# Patient Record
Sex: MALE | Race: WHITE | NOT HISPANIC OR LATINO | Employment: FULL TIME | URBAN - METROPOLITAN AREA
[De-identification: names, ages, dates, MRNs, and addresses within clinical notes are randomized per-mention and may not be internally consistent; named-entity substitution may affect disease eponyms.]

---

## 2017-02-01 ENCOUNTER — HOSPITAL ENCOUNTER (OUTPATIENT)
Dept: FAMILY MEDICINE CLINIC | Facility: CLINIC | Age: 32
Setting detail: SPECIMEN
Discharge: HOME | End: 2017-02-01
Attending: PHYSICIAN ASSISTANT | Admitting: PHYSICIAN ASSISTANT

## 2017-02-01 LAB
ALBUMIN SERPL-MCNC: 3.6 G/DL (ref 3.5–4.8)
ALBUMIN/GLOB SERPL: 1.1 {RATIO} (ref 1–1.7)
ALP SERPL-CCNC: 104 IU/L (ref 32–91)
ALT SERPL-CCNC: 43 IU/L (ref 17–63)
ANION GAP SERPL CALC-SCNC: 13 MMOL/L (ref 10–20)
AST SERPL-CCNC: 32 IU/L (ref 15–41)
BASOPHILS # BLD AUTO: 0.1 10*3/UL (ref 0–0.2)
BASOPHILS NFR BLD AUTO: 1 % (ref 0–2)
BILIRUB SERPL-MCNC: 0.5 MG/DL (ref 0.3–1.2)
BUN SERPL-MCNC: 10 MG/DL (ref 8–20)
BUN/CREAT SERPL: 11.1 (ref 6.2–20.3)
CALCIUM SERPL-MCNC: 9.3 MG/DL (ref 8.9–10.3)
CHLORIDE SERPL-SCNC: 99 MMOL/L (ref 101–111)
CHOLEST SERPL-MCNC: 182 MG/DL
CHOLEST/HDLC SERPL: 5.8 {RATIO}
CONV CO2: 28 MMOL/L (ref 22–32)
CONV LDL CHOLESTEROL DIRECT: 103 MG/DL (ref 0–100)
CONV TOTAL PROTEIN: 6.8 G/DL (ref 6.1–7.9)
CREAT UR-MCNC: 0.9 MG/DL (ref 0.7–1.2)
DIFFERENTIAL METHOD BLD: (no result)
EOSINOPHIL # BLD AUTO: 0.2 10*3/UL (ref 0–0.3)
EOSINOPHIL # BLD AUTO: 2 % (ref 0–3)
ERYTHROCYTE [DISTWIDTH] IN BLOOD BY AUTOMATED COUNT: 13.8 % (ref 11.5–14.5)
GLOBULIN UR ELPH-MCNC: 3.2 G/DL (ref 2.5–3.8)
GLUCOSE SERPL-MCNC: 85 MG/DL (ref 65–99)
HCT VFR BLD AUTO: 52.1 % (ref 40–54)
HDLC SERPL-MCNC: 31 MG/DL
HGB BLD-MCNC: 17.8 G/DL (ref 14–18)
LDLC/HDLC SERPL: 3.3 {RATIO}
LIPID INTERPRETATION: ABNORMAL
LYMPHOCYTES # BLD AUTO: 4.1 10*3/UL (ref 0.8–4.8)
LYMPHOCYTES NFR BLD AUTO: 35 % (ref 18–42)
MCH RBC QN AUTO: 30.9 PG (ref 26–32)
MCHC RBC AUTO-ENTMCNC: 34.2 G/DL (ref 32–36)
MCV RBC AUTO: 90.3 FL (ref 80–94)
MONOCYTES # BLD AUTO: 0.9 10*3/UL (ref 0.1–1.3)
MONOCYTES NFR BLD AUTO: 8 % (ref 2–11)
NEUTROPHILS # BLD AUTO: 6.3 10*3/UL (ref 2.3–8.6)
NEUTROPHILS NFR BLD AUTO: 54 % (ref 50–75)
NRBC BLD AUTO-RTO: 0 /100{WBCS}
NRBC/RBC NFR BLD MANUAL: 0 10*3/UL
PLATELET # BLD AUTO: 210 10*3/UL (ref 150–450)
PMV BLD AUTO: 9.3 FL (ref 7.4–10.4)
POTASSIUM SERPL-SCNC: 4 MMOL/L (ref 3.6–5.1)
RBC # BLD AUTO: 5.77 10*6/UL (ref 4.6–6)
SODIUM SERPL-SCNC: 136 MMOL/L (ref 136–144)
TRIGL SERPL-MCNC: 264 MG/DL
VLDLC SERPL CALC-MCNC: 48.1 MG/DL
WBC # BLD AUTO: 11.7 10*3/UL (ref 4.5–11.5)

## 2018-02-02 ENCOUNTER — HOSPITAL ENCOUNTER (OUTPATIENT)
Dept: FAMILY MEDICINE CLINIC | Facility: CLINIC | Age: 33
Setting detail: SPECIMEN
Discharge: HOME | End: 2018-02-02
Attending: NURSE PRACTITIONER | Admitting: NURSE PRACTITIONER

## 2018-02-02 LAB
ANION GAP SERPL CALC-SCNC: 13 MMOL/L (ref 10–20)
BUN SERPL-MCNC: 14 MG/DL (ref 8–20)
BUN/CREAT SERPL: 15.6 (ref 6.2–20.3)
CALCIUM SERPL-MCNC: 9.1 MG/DL (ref 8.9–10.3)
CHLORIDE SERPL-SCNC: 99 MMOL/L (ref 101–111)
CHOLEST SERPL-MCNC: 163 MG/DL
CHOLEST/HDLC SERPL: 6.2 {RATIO}
CONV CO2: 29 MMOL/L (ref 22–32)
CONV LDL CHOLESTEROL DIRECT: 89 MG/DL (ref 0–100)
CREAT UR-MCNC: 0.9 MG/DL (ref 0.7–1.2)
GLUCOSE SERPL-MCNC: 69 MG/DL (ref 65–99)
HDLC SERPL-MCNC: 26 MG/DL
LDLC/HDLC SERPL: 3.4 {RATIO}
LIPID INTERPRETATION: ABNORMAL
POTASSIUM SERPL-SCNC: 4 MMOL/L (ref 3.6–5.1)
SODIUM SERPL-SCNC: 137 MMOL/L (ref 136–144)
TRIGL SERPL-MCNC: 279 MG/DL
VLDLC SERPL CALC-MCNC: 47.4 MG/DL

## 2018-06-01 ENCOUNTER — HOSPITAL ENCOUNTER (OUTPATIENT)
Dept: CARDIOLOGY | Facility: HOSPITAL | Age: 33
Discharge: HOME | End: 2018-06-01
Attending: PHYSICIAN ASSISTANT | Admitting: PHYSICIAN ASSISTANT

## 2018-06-01 ENCOUNTER — HOSPITAL ENCOUNTER (OUTPATIENT)
Dept: FAMILY MEDICINE CLINIC | Facility: CLINIC | Age: 33
Setting detail: SPECIMEN
Discharge: HOME | End: 2018-06-01
Attending: PHYSICIAN ASSISTANT | Admitting: PHYSICIAN ASSISTANT

## 2018-06-01 LAB
BASOPHILS # BLD AUTO: 0.1 10*3/UL (ref 0–0.2)
BASOPHILS NFR BLD AUTO: 1 % (ref 0–2)
D DIMER PPP FEU-MCNC: 0.59 MG/L FEU (ref 0.17–0.59)
DIFFERENTIAL METHOD BLD: (no result)
EOSINOPHIL # BLD AUTO: 0.3 10*3/UL (ref 0–0.3)
EOSINOPHIL # BLD AUTO: 3 % (ref 0–3)
ERYTHROCYTE [DISTWIDTH] IN BLOOD BY AUTOMATED COUNT: 13.7 % (ref 11.5–14.5)
HCT VFR BLD AUTO: 53.6 % (ref 40–54)
HGB BLD-MCNC: 17.7 G/DL (ref 14–18)
LYMPHOCYTES # BLD AUTO: 3.2 10*3/UL (ref 0.8–4.8)
LYMPHOCYTES NFR BLD AUTO: 29 % (ref 18–42)
MCH RBC QN AUTO: 30.4 PG (ref 26–32)
MCHC RBC AUTO-ENTMCNC: 32.9 G/DL (ref 32–36)
MCV RBC AUTO: 92.3 FL (ref 80–94)
MONOCYTES # BLD AUTO: 1.1 10*3/UL (ref 0.1–1.3)
MONOCYTES NFR BLD AUTO: 10 % (ref 2–11)
NEUTROPHILS # BLD AUTO: 6.4 10*3/UL (ref 2.3–8.6)
NEUTROPHILS NFR BLD AUTO: 57 % (ref 50–75)
NRBC BLD AUTO-RTO: 0 /100{WBCS}
NRBC/RBC NFR BLD MANUAL: 0 10*3/UL
PLATELET # BLD AUTO: 234 10*3/UL (ref 150–450)
PMV BLD AUTO: 9.5 FL (ref 7.4–10.4)
RBC # BLD AUTO: 5.81 10*6/UL (ref 4.6–6)
WBC # BLD AUTO: 11.1 10*3/UL (ref 4.5–11.5)

## 2019-02-28 ENCOUNTER — HOSPITAL ENCOUNTER (OUTPATIENT)
Dept: FAMILY MEDICINE CLINIC | Facility: CLINIC | Age: 34
Setting detail: SPECIMEN
Discharge: HOME | End: 2019-02-28
Attending: PHYSICIAN ASSISTANT | Admitting: PHYSICIAN ASSISTANT

## 2019-02-28 LAB
ALBUMIN SERPL-MCNC: 3.8 G/DL (ref 3.5–4.8)
ALBUMIN/GLOB SERPL: 1.1 {RATIO} (ref 1–1.7)
ALP SERPL-CCNC: 104 IU/L (ref 32–91)
ALT SERPL-CCNC: 59 IU/L (ref 17–63)
ANION GAP SERPL CALC-SCNC: 14.5 MMOL/L (ref 10–20)
AST SERPL-CCNC: 39 IU/L (ref 15–41)
BASOPHILS # BLD AUTO: 0.1 10*3/UL (ref 0–0.2)
BASOPHILS NFR BLD AUTO: 1 % (ref 0–2)
BILIRUB SERPL-MCNC: 0.9 MG/DL (ref 0.3–1.2)
BUN SERPL-MCNC: 13 MG/DL (ref 8–20)
BUN/CREAT SERPL: 13 (ref 6.2–20.3)
CALCIUM SERPL-MCNC: 9.2 MG/DL (ref 8.9–10.3)
CHLORIDE SERPL-SCNC: 93 MMOL/L (ref 101–111)
CONV CO2: 28 MMOL/L (ref 22–32)
CONV TOTAL PROTEIN: 7.3 G/DL (ref 6.1–7.9)
CREAT UR-MCNC: 1 MG/DL (ref 0.7–1.2)
DIFFERENTIAL METHOD BLD: (no result)
EOSINOPHIL # BLD AUTO: 0.2 10*3/UL (ref 0–0.3)
EOSINOPHIL # BLD AUTO: 2 % (ref 0–3)
ERYTHROCYTE [DISTWIDTH] IN BLOOD BY AUTOMATED COUNT: 13.4 % (ref 11.5–14.5)
GLOBULIN UR ELPH-MCNC: 3.5 G/DL (ref 2.5–3.8)
GLUCOSE SERPL-MCNC: 145 MG/DL (ref 65–99)
HCT VFR BLD AUTO: 51.5 % (ref 40–54)
HGB BLD-MCNC: 17.6 G/DL (ref 14–18)
LYMPHOCYTES # BLD AUTO: 4.5 10*3/UL (ref 0.8–4.8)
LYMPHOCYTES NFR BLD AUTO: 37 % (ref 18–42)
MCH RBC QN AUTO: 31.1 PG (ref 26–32)
MCHC RBC AUTO-ENTMCNC: 34.2 G/DL (ref 32–36)
MCV RBC AUTO: 91.1 FL (ref 80–94)
MONOCYTES # BLD AUTO: 0.9 10*3/UL (ref 0.1–1.3)
MONOCYTES NFR BLD AUTO: 8 % (ref 2–11)
NEUTROPHILS # BLD AUTO: 6.5 10*3/UL (ref 2.3–8.6)
NEUTROPHILS NFR BLD AUTO: 52 % (ref 50–75)
NRBC BLD AUTO-RTO: 0 /100{WBCS}
NRBC/RBC NFR BLD MANUAL: 0 10*3/UL
PLATELET # BLD AUTO: 207 10*3/UL (ref 150–450)
PMV BLD AUTO: 9.4 FL (ref 7.4–10.4)
POTASSIUM SERPL-SCNC: 3.5 MMOL/L (ref 3.6–5.1)
RBC # BLD AUTO: 5.66 10*6/UL (ref 4.6–6)
SODIUM SERPL-SCNC: 132 MMOL/L (ref 136–144)
WBC # BLD AUTO: 12.2 10*3/UL (ref 4.5–11.5)

## 2019-03-01 LAB
CONV HIV-1/ HIV-2: NORMAL
CONV HIV-1/ HIV-2: NORMAL

## 2019-05-24 ENCOUNTER — HOSPITAL ENCOUNTER (OUTPATIENT)
Dept: LAB | Facility: HOSPITAL | Age: 34
Discharge: HOME | End: 2019-05-24
Attending: UROLOGY | Admitting: UROLOGY

## 2019-05-24 LAB
PSA SERPL-MCNC: 0.77 NG/ML (ref 0–4)
TESTOST SERPL-MCNC: 4.1 NG/ML (ref 1.7–7.8)

## 2019-08-22 ENCOUNTER — OFFICE VISIT (OUTPATIENT)
Dept: FAMILY MEDICINE CLINIC | Facility: CLINIC | Age: 34
End: 2019-08-22

## 2019-08-22 VITALS
TEMPERATURE: 98.4 F | BODY MASS INDEX: 35.84 KG/M2 | HEART RATE: 103 BPM | HEIGHT: 69 IN | OXYGEN SATURATION: 97 % | DIASTOLIC BLOOD PRESSURE: 83 MMHG | WEIGHT: 242 LBS | SYSTOLIC BLOOD PRESSURE: 115 MMHG

## 2019-08-22 DIAGNOSIS — Z00.00 PREVENTATIVE HEALTH CARE: Primary | ICD-10-CM

## 2019-08-22 PROBLEM — R00.0 SINUS TACHYCARDIA: Status: ACTIVE | Noted: 2018-06-11

## 2019-08-22 PROBLEM — I10 BENIGN ESSENTIAL HYPERTENSION: Status: ACTIVE | Noted: 2018-02-02

## 2019-08-22 PROBLEM — E11.65 TYPE 2 DIABETES MELLITUS WITH HYPERGLYCEMIA: Status: ACTIVE | Noted: 2019-03-01

## 2019-08-22 PROBLEM — F17.200 TOBACCO DEPENDENCE SYNDROME: Status: ACTIVE | Noted: 2017-02-01

## 2019-08-22 PROBLEM — E66.9 OBESITY: Status: ACTIVE | Noted: 2017-02-01

## 2019-08-22 LAB
ARTICHOKE IGE QN: 110 MG/DL (ref 0–100)
CHOLEST SERPL-MCNC: 161 MG/DL
HBA1C MFR BLD: 5.5 % (ref 3.5–5.6)
HDLC SERPL QL: 4.74
HDLC SERPL-MCNC: 34 MG/DL
LDLC/HDLC SERPL: 2.91 {RATIO}
TRIGL SERPL-MCNC: 141 MG/DL
VLDLC SERPL-MCNC: 28.2 MG/DL

## 2019-08-22 PROCEDURE — 99395 PREV VISIT EST AGE 18-39: CPT | Performed by: PHYSICIAN ASSISTANT

## 2019-08-22 PROCEDURE — 80061 LIPID PANEL: CPT | Performed by: PHYSICIAN ASSISTANT

## 2019-08-22 PROCEDURE — 36415 COLL VENOUS BLD VENIPUNCTURE: CPT | Performed by: PHYSICIAN ASSISTANT

## 2019-08-22 PROCEDURE — 83036 HEMOGLOBIN GLYCOSYLATED A1C: CPT | Performed by: PHYSICIAN ASSISTANT

## 2019-08-22 RX ORDER — ONDANSETRON 8 MG/1
TABLET, ORALLY DISINTEGRATING ORAL EVERY 8 HOURS
COMMUNITY
Start: 2019-01-31 | End: 2020-02-27

## 2019-08-22 RX ORDER — BLOOD-GLUCOSE METER
EACH MISCELLANEOUS
COMMUNITY
Start: 2019-03-01 | End: 2022-09-12 | Stop reason: SDUPTHER

## 2019-08-22 RX ORDER — FLASH GLUCOSE SENSOR
KIT MISCELLANEOUS
COMMUNITY
Start: 2019-06-07 | End: 2020-07-09 | Stop reason: SDUPTHER

## 2019-08-22 RX ORDER — AMLODIPINE BESYLATE 10 MG/1
TABLET ORAL
COMMUNITY
Start: 2019-05-28 | End: 2020-12-11

## 2019-08-22 RX ORDER — TESTOSTERONE CYPIONATE 200 MG/ML
INJECTION, SOLUTION INTRAMUSCULAR
COMMUNITY
Start: 2019-07-29 | End: 2020-12-11

## 2019-08-22 RX ORDER — METOPROLOL SUCCINATE 50 MG/1
TABLET, EXTENDED RELEASE ORAL EVERY 24 HOURS
COMMUNITY
Start: 2018-09-14 | End: 2021-07-13

## 2019-08-22 RX ORDER — METHADONE HYDROCHLORIDE 5 MG/5ML
130 SOLUTION ORAL EVERY 24 HOURS
COMMUNITY
Start: 2014-06-16

## 2019-08-22 RX ORDER — FLASH GLUCOSE SCANNING READER
EACH MISCELLANEOUS
COMMUNITY
Start: 2019-06-07 | End: 2020-07-21 | Stop reason: SDUPTHER

## 2019-08-22 NOTE — PROGRESS NOTES
"Subjective  Aram Moore is a 33 y.o. male     History of Present Illness  Patient is a 33-year-old white male here for biometric screening for his insurance.  He denies any changes to his medications and denies any new problems.      The following portions of the patient's history were reviewed and updated as appropriate: allergies, current medications, past family history, past medical history, past social history, past surgical history and problem list.    Review of Systems   Constitutional: Negative for fever.   HENT: Negative for sore throat.    Respiratory: Negative for shortness of breath.    Cardiovascular: Negative for chest pain.   Gastrointestinal: Negative for abdominal pain and blood in stool.   Musculoskeletal: Negative for joint swelling.   Neurological: Negative for seizures and confusion.   Psychiatric/Behavioral: Negative for depressed mood.       Objective  Physical Exam   Constitutional: He is oriented to person, place, and time. He appears well-developed and well-nourished.   HENT:   Head: Normocephalic and atraumatic.   Right Ear: External ear normal.   Left Ear: External ear normal.   Mouth/Throat: Oropharynx is clear and moist.   Eyes: Conjunctivae and EOM are normal. Pupils are equal, round, and reactive to light.   Neck: Normal range of motion. Neck supple.   Cardiovascular: Normal rate, regular rhythm and normal heart sounds.   Pulmonary/Chest: Effort normal and breath sounds normal.   Abdominal: Soft. Bowel sounds are normal.   Musculoskeletal: Normal range of motion.   Neurological: He is alert and oriented to person, place, and time.   Skin: Skin is warm and dry.       Vitals:    08/22/19 0850   BP: 115/83   BP Location: Right arm   Patient Position: Sitting   Cuff Size: Adult   Pulse: 103   Temp: 98.4 °F (36.9 °C)   TempSrc: Oral   SpO2: 97%   Weight: 110 kg (242 lb)   Height: 175.3 cm (69\")     Body mass index is 35.74 kg/m².    PHQ-9 Total Score: 0      Assessment/Plan  Diagnoses " and all orders for this visit:    1. Preventative health care (Primary)  Comments:  Labs today, I will fill out forms that patient brought when I get the blood work results back.  Orders:  -     Lipid Panel  -     Hemoglobin A1c

## 2019-09-20 ENCOUNTER — HOSPITAL ENCOUNTER (OUTPATIENT)
Dept: GENERAL RADIOLOGY | Facility: HOSPITAL | Age: 34
Discharge: HOME | End: 2019-09-20
Admitting: PHYSICIAN ASSISTANT

## 2019-09-20 ENCOUNTER — OFFICE VISIT (OUTPATIENT)
Dept: FAMILY MEDICINE CLINIC | Facility: CLINIC | Age: 34
End: 2019-09-20

## 2019-09-20 VITALS
TEMPERATURE: 98.2 F | BODY MASS INDEX: 36.92 KG/M2 | OXYGEN SATURATION: 100 % | SYSTOLIC BLOOD PRESSURE: 131 MMHG | HEART RATE: 96 BPM | WEIGHT: 250 LBS | DIASTOLIC BLOOD PRESSURE: 83 MMHG

## 2019-09-20 DIAGNOSIS — M25.561 ACUTE PAIN OF RIGHT KNEE: Primary | ICD-10-CM

## 2019-09-20 PROCEDURE — 73562 X-RAY EXAM OF KNEE 3: CPT

## 2019-09-20 PROCEDURE — 99213 OFFICE O/P EST LOW 20 MIN: CPT | Performed by: PHYSICIAN ASSISTANT

## 2019-09-20 RX ORDER — DICLOFENAC SODIUM 75 MG/1
75 TABLET, DELAYED RELEASE ORAL 2 TIMES DAILY
Qty: 60 TABLET | Refills: 0 | Status: SHIPPED | OUTPATIENT
Start: 2019-09-20 | End: 2020-12-11 | Stop reason: SDUPTHER

## 2019-09-20 NOTE — PROGRESS NOTES
Subjective  Aram Moore is a 33 y.o. male     History of Present Illness    Patient is a 33-year-old white male complaining of right knee pain for the last 10 days.  He states he has been playing tennis more than usual lately.  The pain has begun slowly over the last 10 days, there was no acute injury to the area.  He says it feels like popcorn when he walks on it.  He has tried ibuprofen over-the-counter which does not help.  He is wearing a brace on the right knee today.    The following portions of the patient's history were reviewed and updated as appropriate: allergies, current medications and problem list.    Review of Systems   Musculoskeletal: Positive for arthralgias (r knee pain).       Objective  Physical Exam   Musculoskeletal:        Right knee: He exhibits normal range of motion, no swelling, no effusion, no ecchymosis, no deformity, no laceration, no erythema, normal alignment, no LCL laxity, no bony tenderness, normal meniscus and no MCL laxity. No tenderness found.        Legs:      Vitals:    09/20/19 0914   BP: 131/83   BP Location: Right arm   Patient Position: Sitting   Cuff Size: Adult   Pulse: 96   Temp: 98.2 °F (36.8 °C)   TempSrc: Oral   SpO2: 100%   Weight: 113 kg (250 lb)     Body mass index is 36.92 kg/m².    PHQ-9 Total Score:        Assessment/Plan  Diagnoses and all orders for this visit:    1. Acute pain of right knee (Primary)  Comments:  X-ray of the right knee ordered.  Patient to complete some physical therapy prior to additional imaging being ordered.  Will refer as needed.  Rx for diclofenac to try for pain.  Recommended rice therapy to the knee.  Orders:  -     XR Knee 3 View Right  -     Ambulatory Referral to Physical Therapy Evaluate and treat    Other orders  -     diclofenac (VOLTAREN) 75 MG EC tablet; Take 1 tablet by mouth 2 (Two) Times a Day.  Dispense: 60 tablet; Refill: 0

## 2019-11-19 ENCOUNTER — TREATMENT (OUTPATIENT)
Dept: PHYSICAL THERAPY | Facility: CLINIC | Age: 34
End: 2019-11-19

## 2019-11-19 DIAGNOSIS — M25.561 ACUTE PAIN OF RIGHT KNEE: Primary | ICD-10-CM

## 2019-11-19 PROCEDURE — 97110 THERAPEUTIC EXERCISES: CPT | Performed by: PHYSICAL THERAPIST

## 2019-11-19 PROCEDURE — 97162 PT EVAL MOD COMPLEX 30 MIN: CPT | Performed by: PHYSICAL THERAPIST

## 2019-11-19 PROCEDURE — 97140 MANUAL THERAPY 1/> REGIONS: CPT | Performed by: PHYSICAL THERAPIST

## 2019-11-19 NOTE — PROGRESS NOTES
Physical Therapy Initial Evaluation and Plan of Care    Patient: Aram Moore   : 1985  Diagnosis/ICD-10 Code:  Acute pain of right knee [M25.561]  Referring practitioner: LETICIA Tenorio  Date of Initial Visit: 2019  Today's Date: 2019  Patient seen for 1 sessions           Subjective Questionnaire: Knee Outcome survey: 63% functional ability      Subjective Evaluation    History of Present Illness  Mechanism of injury: Pt reports that he was playing tennis around 2 months and hurt R knee. Pt reports no specific movement that hurt just gradual onset. Felt it giving away for a few weeks after that. Didn't think anything about it but then one night it swelled up. Now feels like he can feel cartilage moving around in there. He has trouble going up and down stairs. No previous history of knee injuries. Physician thinks it's possibly meniscus tear but can't have MRI until after PT sessions. Keeping knee bent for a while or staying in certain positions for a while increase pain. Keeping knee straight doesn't cause pain. Walking increases pain as well. Pt was given NSAID from physician which helps with swelling some. No numbness or tingling. Can't play tennis right now. 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 Knee still feels like it's giving out a little but it has improved sine the first few weeks. Knee feels more unsteady and uncomfortable but not as much pain recently. MD as needed.     Pain  Current pain ratin  At best pain ratin  At worst pain ratin             Objective       Palpation     Right Tenderness of the rectus femoris.     Tenderness     Right Knee   Tenderness in the quadriceps tendon. No tenderness in the lateral joint line, LCL (distal), LCL (proximal), MCL (distal), MCL (proximal), medial joint line and patellar tendon.     Neurological Testing     Sensation     Knee   Left Knee   Intact: light touch    Right Knee   Intact: light touch     Active  Range of Motion   Left Knee   Normal active range of motion    Right Knee   Flexion: 127 degrees   Extension: 0 degrees     Additional Active Range of Motion Details  No pain with knee AROM    Patellar Mobility     Right Knee Patellar tendons within functional limits include the lateral and superior. Hypomobile in the medial and inferior patellar tendon(s).     Strength/Myotome Testing     Left Knee   Normal strength    Right Knee   Normal strength    Tests     Right Knee   Negative anterior drawer, lateral Jodi, medial Jodi, posterior drawer, valgus stress test at 0 degrees and varus stress test at 0 degrees.     Additional Tests Details  No pain with any special tests     Functional Assessment   Squat   Left tibial anterior translation beyond toes and right tibial anterior translation beyond toes. No pain, no left valgus, no left varus, no right valgus and no right varus.          Assessment & Plan     Assessment  Impairments: abnormal coordination, abnormal gait, abnormal muscle firing, abnormal muscle tone, abnormal or restricted ROM, activity intolerance, impaired balance, impaired physical strength, lacks appropriate home exercise program and pain with function  Assessment details: Pt is a 34 year old male with c/o R knee pain. Pt demonstrates no pain with special tests for ligaments or meniscus. Some tenderness along distal quad and patellar tendon. No pain with flexion, however was limited compared to L. ROM improved from 127 to 132 degrees flexion after manual therapy this date. Pt displays no pain with squat, however felt it would hurt if he went lower. Pt displays decreased ability to perform eccentric activities like descending steps due to pain. Difficulty with other functional tasks as well due to pain.   Prognosis: good  Functional Limitations: lifting, walking, pulling, pushing, uncomfortable because of pain, sitting and standing  Goals  Plan Goals: STG:  Pt will demonstrate increased  activation of quad within 3 weeks.     Pt will display improved ROM of R knee to WNL with min to no pain within 3 weeks.     LTG:  Pt will be independent with home exercises within 6 weeks to assist with pain management and continue to improve function.     Pt will demonstrate increased score on the Knee outcome survey to 85% within 6 weeks to demonstrate increased functional ability.     Pt will be able to ambulate up/down steps with min to no pain within 6 weeks.     Pt will be able to perform ADL's and other daily tasks with proper mechanics and min to no pain within 6 weeks.       Plan  Therapy options: will be seen for skilled physical therapy services  Other planned modality interventions: modalities as needed  Planned therapy interventions: balance/weight-bearing training, body mechanics training, flexibility, functional ROM exercises, gait training, joint mobilization, home exercise program, manual therapy, motor coordination training, neuromuscular re-education, soft tissue mobilization, spinal/joint mobilization, strengthening, stretching and therapeutic activities  Plan details: 30 visits per POC 90 day certification period          Eval:  Low Eval          Mins  58791  Mod Eval     15     Mins  34792  High Eval                            Mins  69817  Re-Eval                               mins  86395    Timed:         Manual Therapy:    15     mins  13003;     Therapeutic Exercise:    23     mins  65624;     Neuromuscular Theodora:        mins  14056;    Therapeutic Activity:          mins  29585;     Gait Training:           mins  37589;     Ultrasound:          mins  82183;    Ionto                                   mins   50205  Self Care                            mins   38018    Un-Timed:  Electrical Stimulation:         mins  48633 ( );  Traction          mins 84049      Timed Treatment:   38   mins   Total Treatment:     53   mins    PT SIGNATURE: Keisha Montoya, PT, DPT, OCS           [place]  License # [id number]   DATE TREATMENT INITIATED: 11/19/2019    Initial Certification  Certification Period: 2/17/2020  I certify that the therapy services are furnished while this patient is under my care.  The services outlined above are required by this patient, and will be reviewed every 90 days.     PHYSICIAN: Sanaz Martinez PA      DATE:     Please sign and return via fax to  .. Thank you, [facility].

## 2019-11-25 ENCOUNTER — TREATMENT (OUTPATIENT)
Dept: PHYSICAL THERAPY | Facility: CLINIC | Age: 34
End: 2019-11-25

## 2019-11-25 DIAGNOSIS — M25.561 ACUTE PAIN OF RIGHT KNEE: Primary | ICD-10-CM

## 2019-11-25 PROCEDURE — 97110 THERAPEUTIC EXERCISES: CPT | Performed by: PHYSICAL THERAPIST

## 2019-11-25 PROCEDURE — 97140 MANUAL THERAPY 1/> REGIONS: CPT | Performed by: PHYSICAL THERAPIST

## 2019-11-25 NOTE — PROGRESS NOTES
Physical Therapy Daily Progress Note    VISIT#: 2    Subjective   Aram Moore reports: that knee felt a lot better after last session.       Objective     See Exercise, Manual, and Modality Logs for complete treatment.       Assessment & Plan     Assessment  Assessment details: Pt displays mod fatigue with exercises. Pt demonstrates difficulty with proper squat mechanics standing, however tolerated TG well. No c/o pain in knee with treatment session.           Progress per Plan of Care            Timed:         Manual Therapy:    10     mins  54659;     Therapeutic Exercise:    20     mins  63213;     Neuromuscular Theodora:        mins  68571;    Therapeutic Activity:          mins  70315;     Gait Training:           mins  54807;     Ultrasound:          mins  85967;    Ionto                                   mins   13372  Self Care                            mins   67184    Un-Timed:  Electrical Stimulation:         mins  21513 ( );  Traction          mins 26917    Timed Treatment:   30   mins   Total Treatment:     30   mins    Keisha Montoya, PT, DPT, OCS  [place] License # [id number]  Physical Therapist   Numbers [phone]

## 2019-12-02 ENCOUNTER — TREATMENT (OUTPATIENT)
Dept: PHYSICAL THERAPY | Facility: CLINIC | Age: 34
End: 2019-12-02

## 2019-12-02 DIAGNOSIS — M25.561 ACUTE PAIN OF RIGHT KNEE: Primary | ICD-10-CM

## 2019-12-02 PROCEDURE — 97140 MANUAL THERAPY 1/> REGIONS: CPT | Performed by: PHYSICAL THERAPIST

## 2019-12-02 PROCEDURE — 97110 THERAPEUTIC EXERCISES: CPT | Performed by: PHYSICAL THERAPIST

## 2019-12-02 NOTE — PROGRESS NOTES
Physical Therapy Daily Progress Note    VISIT#: 3    Subjective   Aram Moore reports: that his knee has been feeling better. He has been trying to massage it on his own throughout the day which seems to help keep it loose.       Objective     See Exercise, Manual, and Modality Logs for complete treatment.       Assessment & Plan     Assessment  Assessment details: Pt demonstrates improved ability to perform exercises. Pt tolerated added stretching and exercises well. No c/o pain in knee during treatment.           Progress per Plan of Care            Timed:         Manual Therapy:    10     mins  88187;     Therapeutic Exercise:    20     mins  19449;     Neuromuscular Theodora:        mins  08820;    Therapeutic Activity:          mins  50237;     Gait Training:           mins  15338;     Ultrasound:          mins  80171;    Ionto                                   mins   65349  Self Care                            mins   90345    Un-Timed:  Electrical Stimulation:         mins  58269 ( );  Traction          mins 29184    Timed Treatment:   30   mins   Total Treatment:     30   mins    Keisha Montoya, PT, DPT, OCS  [place] License # [id number]  Physical Therapist

## 2019-12-06 ENCOUNTER — TREATMENT (OUTPATIENT)
Dept: PHYSICAL THERAPY | Facility: CLINIC | Age: 34
End: 2019-12-06

## 2019-12-06 DIAGNOSIS — M25.561 ACUTE PAIN OF RIGHT KNEE: Primary | ICD-10-CM

## 2019-12-06 PROCEDURE — 97140 MANUAL THERAPY 1/> REGIONS: CPT | Performed by: PHYSICAL THERAPIST

## 2019-12-06 PROCEDURE — 97110 THERAPEUTIC EXERCISES: CPT | Performed by: PHYSICAL THERAPIST

## 2019-12-06 NOTE — PROGRESS NOTES
Physical Therapy Daily Progress Note    VISIT#: 4    Subjective   Aram Moore reports: that he is doing well. No pain after last session. He hasn't been having as much pain. He still has some popping in knee that's painful but it has been less frequent.       Objective     See Exercise, Manual, and Modality Logs for complete treatment.       Assessment & Plan     Assessment  Assessment details: Pt displays mod fatigue with exercises. No c/o pain in knee during treatment. Pt tolerated treatment well overall.           Progress per Plan of Care            Timed:         Manual Therapy:    10     mins  78413;     Therapeutic Exercise:    30     mins  93969;     Neuromuscular Theodora:        mins  25291;    Therapeutic Activity:          mins  39437;     Gait Training:           mins  53628;     Ultrasound:          mins  02087;    Ionto                                   mins   87910  Self Care                            mins   81659    Un-Timed:  Electrical Stimulation:         mins  22640 ( );  Traction          mins 42589    Timed Treatment:   40   mins   Total Treatment:     40   mins    Keisha Montoya, PT, DPT, OCS  [place] License # [id number]  Physical Therapist

## 2019-12-10 ENCOUNTER — TREATMENT (OUTPATIENT)
Dept: PHYSICAL THERAPY | Facility: CLINIC | Age: 34
End: 2019-12-10

## 2019-12-10 DIAGNOSIS — M25.561 ACUTE PAIN OF RIGHT KNEE: Primary | ICD-10-CM

## 2019-12-10 PROCEDURE — 97110 THERAPEUTIC EXERCISES: CPT | Performed by: PHYSICAL THERAPIST

## 2019-12-10 PROCEDURE — 97140 MANUAL THERAPY 1/> REGIONS: CPT | Performed by: PHYSICAL THERAPIST

## 2019-12-11 NOTE — PROGRESS NOTES
Physical Therapy Daily Progress Note    VISIT#: 5    Subjective   Aram Moore reports: that his knee has been doing better. It mostly just gets stiff if he hasn't been moving. He is sore today all over from exercising yesterday.       Objective     See Exercise, Manual, and Modality Logs for complete treatment.       Assessment & Plan     Assessment  Assessment details: Pt demonstrates difficulty with exercises this date due to soreness and did not want to do SLS. Pt displays less tenderness during manual therapy. Pt tolerated treatment well overall.           Progress per Plan of Care            Timed:         Manual Therapy:    10     mins  36722;     Therapeutic Exercise:    20     mins  39142;     Neuromuscular Theodora:        mins  12803;    Therapeutic Activity:          mins  08074;     Gait Training:           mins  32542;     Ultrasound:          mins  72471;    Ionto                                   mins   02503  Self Care                            mins   40735    Un-Timed:  Electrical Stimulation:         mins  60826 ( );  Traction          mins 02355    Timed Treatment:   30   mins   Total Treatment:     30   mins    Keisha Montoya, PT, DPT, OCS  [place] License # [id number]  Physical Therapist

## 2019-12-13 ENCOUNTER — HOSPITAL ENCOUNTER (OUTPATIENT)
Dept: ONCOLOGY | Facility: HOSPITAL | Age: 34
Setting detail: INFUSION SERIES
Discharge: HOME | End: 2019-12-13

## 2019-12-13 ENCOUNTER — APPOINTMENT (OUTPATIENT)
Dept: LAB | Facility: HOSPITAL | Age: 34
End: 2019-12-13

## 2019-12-13 ENCOUNTER — CONSULT (OUTPATIENT)
Dept: ONCOLOGY | Facility: CLINIC | Age: 34
End: 2019-12-13

## 2019-12-13 VITALS
HEART RATE: 96 BPM | WEIGHT: 248 LBS | TEMPERATURE: 98.1 F | BODY MASS INDEX: 36.73 KG/M2 | SYSTOLIC BLOOD PRESSURE: 125 MMHG | DIASTOLIC BLOOD PRESSURE: 81 MMHG | HEIGHT: 69 IN | RESPIRATION RATE: 18 BRPM

## 2019-12-13 VITALS — SYSTOLIC BLOOD PRESSURE: 127 MMHG | DIASTOLIC BLOOD PRESSURE: 85 MMHG

## 2019-12-13 DIAGNOSIS — D75.1 POLYCYTHEMIA: Primary | ICD-10-CM

## 2019-12-13 LAB
BASOPHILS # BLD AUTO: 0.01 10*3/MM3 (ref 0–0.2)
BASOPHILS NFR BLD AUTO: 0.1 % (ref 0–1.5)
DEPRECATED RDW RBC AUTO: 45.7 FL (ref 37–54)
EOSINOPHIL # BLD AUTO: 0.22 10*3/MM3 (ref 0–0.4)
EOSINOPHIL NFR BLD AUTO: 1.8 % (ref 0.3–6.2)
ERYTHROCYTE [DISTWIDTH] IN BLOOD BY AUTOMATED COUNT: 14.3 % (ref 12.3–15.4)
FERRITIN SERPL-MCNC: 187.4 NG/ML (ref 30–400)
HCT VFR BLD AUTO: 54.4 % (ref 37.5–51)
HGB BLD-MCNC: 19 G/DL (ref 13–17.7)
LYMPHOCYTES # BLD AUTO: 4.3 10*3/MM3 (ref 0.7–3.1)
LYMPHOCYTES NFR BLD AUTO: 35.1 % (ref 19.6–45.3)
MCH RBC QN AUTO: 31.3 PG (ref 26.6–33)
MCHC RBC AUTO-ENTMCNC: 34.9 G/DL (ref 31.5–35.7)
MCV RBC AUTO: 89.5 FL (ref 79–97)
MONOCYTES # BLD AUTO: 1.05 10*3/MM3 (ref 0.1–0.9)
MONOCYTES NFR BLD AUTO: 8.6 % (ref 5–12)
NEUTROPHILS # BLD AUTO: 6.66 10*3/MM3 (ref 1.7–7)
NEUTROPHILS NFR BLD AUTO: 54.4 % (ref 42.7–76)
PLATELET # BLD AUTO: 244 10*3/MM3 (ref 140–450)
PMV BLD AUTO: 10.2 FL (ref 6–12)
RBC # BLD AUTO: 6.08 10*6/MM3 (ref 4.14–5.8)
WBC NRBC COR # BLD: 12.24 10*3/MM3 (ref 3.4–10.8)

## 2019-12-13 PROCEDURE — 99195 PHLEBOTOMY: CPT | Performed by: INTERNAL MEDICINE

## 2019-12-13 PROCEDURE — 99204 OFFICE O/P NEW MOD 45 MIN: CPT | Performed by: INTERNAL MEDICINE

## 2019-12-13 PROCEDURE — 82728 ASSAY OF FERRITIN: CPT | Performed by: INTERNAL MEDICINE

## 2019-12-13 PROCEDURE — 85025 COMPLETE CBC W/AUTO DIFF WBC: CPT | Performed by: INTERNAL MEDICINE

## 2019-12-13 PROCEDURE — 36415 COLL VENOUS BLD VENIPUNCTURE: CPT | Performed by: INTERNAL MEDICINE

## 2019-12-13 NOTE — PROGRESS NOTES
Pt here for phlebotomy 18 g iv inserted and 500 ml blood phlebotomized. Pt. Tolerated procedure well with a post b/p of 127/85

## 2019-12-13 NOTE — PROGRESS NOTES
Information on polycythemia and phlebotomy was given to pt. Pt verbalized understanding of information.

## 2019-12-13 NOTE — PROGRESS NOTES
" Hematology/Oncology Outpatient Consultation    Patient name: Aram Moore  : 1985  MRN: [id number]  Primary Care Physician: Sanaz Martinez PA  Referring Physician: Sanaz Martinez PA  Reason For Consult:   Polycythemia  Testosterone replacement  History of Present Illness:  Mr. Moore reports that he has a history of drug abuse and that that was later switched to methadone and he is on methadone withdrawal program.  Apparently side effects of methadone include facial flushing and hot flashes and hence patient was put on testosterone replacement.  Patient denies history of testosterone deficiency.  He had laboratory work-up done in 2019 which revealed highly elevated hemoglobin to 18.1.  Patient was sent to the cancer care center and seen initially on 2019.  Past Medical History:   Diagnosis Date   • Anxiety    • Depression    • Diabetes mellitus (CMS/HCC)    • Erectile dysfunction    • Hypertension    • Opiate addiction (CMS/HCC)     opiate addiction treatment   • Pneumonia    • Substance abuse (CMS/HCC)    • Tendonitis of ankle or foot     bilateral achilles tendonitis       Past Surgical History:   Procedure Laterality Date   • FRACTURE SURGERY     • SHOULDER SURGERY Right          Current Outpatient Medications:   •  Blood Glucose Monitoring Suppl (ACCU-CHEK COLT PLUS) w/Device kit, ACCU-CHEK COLT PLUS w/Device KIT, Disp: , Rfl:   •  Continuous Blood Gluc  (FREESTYLE KIRSTEN 14 DAY READER) device, FREESTYLE KIRSTEN 14 DAY READER EDUARDA, Disp: , Rfl:   •  Continuous Blood Gluc Sensor (FREESTYLE KIRSTEN 14 DAY SENSOR) misc, FREESTYLE KIRSTEN 14 DAY SENSOR, Disp: , Rfl:   •  methadone (DOLOPHINE) 5 MG/5ML solution, 130 mg Daily., Disp: , Rfl:   •  metoprolol succinate XL (TOPROL-XL) 50 MG 24 hr tablet, Daily., Disp: , Rfl:   •  Multiple Vitamins-Minerals (MENS ONE DAILY PO), Take  by mouth., Disp: , Rfl:   •  Needle, Disp, (BD DISP NEEDLES) 22G X 1-1/2\" misc, BD DISP NEEDLES 22G X " "1-\", Disp: , Rfl:   •  ondansetron ODT (ZOFRAN-ODT) 8 MG disintegrating tablet, Every 8 (Eight) Hours., Disp: , Rfl:   •  Syringe, Disposable, (B-D SYRINGE LUER-ERI 3CC) 3 ML misc, BD SYRINGE LUER-ERI 3 ML, Disp: , Rfl:   •  Testosterone Cypionate (DEPOTESTOTERONE CYPIONATE) 200 MG/ML injection, , Disp: , Rfl:   •  amLODIPine (NORVASC) 10 MG tablet, , Disp: , Rfl:   •  diclofenac (VOLTAREN) 75 MG EC tablet, Take 1 tablet by mouth 2 (Two) Times a Day., Disp: 60 tablet, Rfl: 0    Allergies   Allergen Reactions   • Cefaclor Hives         There is no immunization history on file for this patient.    Family History   Problem Relation Age of Onset   • Arthritis Mother    • Arthritis Maternal Grandfather        Cancer-related family history is not on file.    Social History     Tobacco Use   • Smoking status: Former Smoker     Types: Cigarettes     Start date:      Last attempt to quit: 11/15/2019     Years since quittin.0   • Smokeless tobacco: Never Used   Substance Use Topics   • Alcohol use: No     Frequency: Never   • Drug use: No     Subjective:  Patient reports to hot flashes night sweats.  Does not smoke.  Does not work not exposed to smoke.  Denies chest pain reports shortness of air on exertion  ROS:    Review of Systems   Constitutional: Negative for fever.   HENT: Negative for nosebleeds and trouble swallowing.    Eyes: Negative for visual disturbance.   Respiratory: Negative for cough, shortness of breath and wheezing.    Cardiovascular: Negative for chest pain.   Gastrointestinal: Negative for abdominal pain and blood in stool.   Endocrine: Negative for cold intolerance.   Genitourinary: Negative for dysuria and hematuria.   Musculoskeletal: Negative for joint swelling.   Skin: Negative for rash.   Allergic/Immunologic: Negative for environmental allergies.   Neurological: Negative for seizures.   Hematological: Does not bruise/bleed easily.   Psychiatric/Behavioral: The patient is not " "nervous/anxious.        Objective:    Vitals:    12/13/19 1013   BP: 125/81   Pulse: 96   Resp: 18   Temp: 98.1 °F (36.7 °C)   Weight: 112 kg (248 lb)   Height: 175.3 cm (69\")   PainSc: 0-No pain       ECOG1  Physical Exam:    Physical Exam   Constitutional: He is oriented to person, place, and time. No distress.   Moderately built morbidly obese   HENT:   Head: Normocephalic and atraumatic.   Eyes: Conjunctivae and EOM are normal. Right eye exhibits no discharge. Left eye exhibits no discharge. No scleral icterus.   Neck: Normal range of motion. Neck supple. No thyromegaly present.   Cardiovascular: Normal rate, regular rhythm and normal heart sounds. Exam reveals no gallop and no friction rub.   Pulmonary/Chest: Effort normal. No stridor. No respiratory distress. He has no wheezes.   Decreased breath sounds bilaterally   Abdominal: Soft. Bowel sounds are normal. He exhibits no mass. There is no tenderness. There is no rebound and no guarding.   Musculoskeletal: Normal range of motion. He exhibits no tenderness.   Lymphadenopathy:     He has no cervical adenopathy.   Neurological: He is alert and oriented to person, place, and time. He exhibits normal muscle tone.   Skin: Skin is warm. No rash noted. He is not diaphoretic. No erythema.   Facial flushing   Psychiatric: He has a normal mood and affect. His behavior is normal.   Nursing note and vitals reviewed.      RECENT LABS  WBC   Date Value Ref Range Status   12/13/2019 12.24 (H) 3.40 - 10.80 10*3/mm3 Final     RBC   Date Value Ref Range Status   12/13/2019 6.08 (H) 4.14 - 5.80 10*6/mm3 Final     Hemoglobin   Date Value Ref Range Status   12/13/2019 19.0 (H) 13.0 - 17.7 g/dL Final     Hematocrit   Date Value Ref Range Status   12/13/2019 54.4 (H) 37.5 - 51.0 % Final     MCV   Date Value Ref Range Status   12/13/2019 89.5 79.0 - 97.0 fL Final     MCH   Date Value Ref Range Status   12/13/2019 31.3 26.6 - 33.0 pg Final     MCHC   Date Value Ref Range Status "   12/13/2019 34.9 31.5 - 35.7 g/dL Final     RDW   Date Value Ref Range Status   12/13/2019 14.3 12.3 - 15.4 % Final     RDW-SD   Date Value Ref Range Status   12/13/2019 45.7 37.0 - 54.0 fl Final     MPV   Date Value Ref Range Status   12/13/2019 10.2 6.0 - 12.0 fL Final     Platelets   Date Value Ref Range Status   12/13/2019 244 140 - 450 10*3/mm3 Final     Neutrophil %   Date Value Ref Range Status   12/13/2019 54.4 42.7 - 76.0 % Final     Lymphocyte %   Date Value Ref Range Status   12/13/2019 35.1 19.6 - 45.3 % Final     Monocyte %   Date Value Ref Range Status   12/13/2019 8.6 5.0 - 12.0 % Final     Eosinophil %   Date Value Ref Range Status   12/13/2019 1.8 0.3 - 6.2 % Final     Basophil %   Date Value Ref Range Status   12/13/2019 0.1 0.0 - 1.5 % Final     Neutrophils, Absolute   Date Value Ref Range Status   12/13/2019 6.66 1.70 - 7.00 10*3/mm3 Final     Lymphocytes, Absolute   Date Value Ref Range Status   12/13/2019 4.30 (H) 0.70 - 3.10 10*3/mm3 Final     Monocytes, Absolute   Date Value Ref Range Status   12/13/2019 1.05 (H) 0.10 - 0.90 10*3/mm3 Final     Eosinophils, Absolute   Date Value Ref Range Status   12/13/2019 0.22 0.00 - 0.40 10*3/mm3 Final     Basophils, Absolute   Date Value Ref Range Status   12/13/2019 0.01 0.00 - 0.20 10*3/mm3 Final     nRBC   Date Value Ref Range Status   02/28/2019 0 0 /100[WBCs] Final       Lab Results   Component Value Date    GLUCOSE 145 (H) 02/28/2019    BUN 13 02/28/2019    CREATININE 1.0 02/28/2019    BCR 13.0 02/28/2019    K 3.5 (L) 02/28/2019    CO2 28 02/28/2019    CALCIUM 9.2 02/28/2019    ALBUMIN 3.8 02/28/2019    LABIL2 1.1 02/28/2019    AST 39 02/28/2019    ALT 59 02/28/2019         Assessment/Plan     Polycythemia  - CBC & Differential  - CBC & Differential  - CBC Auto Differential  - Hematocrit  - Ferritin    Assessment:  1. Secondary polycythemia  2. Testosterone replacement  3. Treatment with methadone  4. Obesity  Plan:  1. Reviewed the CBC results  with the patient patient's hemoglobin is highly elevated to 18.1.  Will perform phlebotomy today and every 2 weeks with a hematocrit is above 45.  2. Patient reports that he does not wish to be on the testosterone for the side effects but he has to be on it for the methadone program.  I am not aware of this treatment in the past but it is being prescribed.  He will continue it at this time.  Will phlebotomize him if the hematocrit reaches above 45  3. Continue methadone  4. Encouraged him to lose weight  5. I will see him back in my office in 6 weeks recheck CBC at that time    I have reviewed labs results, imaging, vitals, and medications with the patient today.    Patient verbalized understanding and is in agreement of the above plan.

## 2019-12-16 ENCOUNTER — TELEPHONE (OUTPATIENT)
Dept: PHYSICAL THERAPY | Facility: CLINIC | Age: 34
End: 2019-12-16

## 2019-12-27 ENCOUNTER — HOSPITAL ENCOUNTER (OUTPATIENT)
Dept: ONCOLOGY | Facility: HOSPITAL | Age: 34
Setting detail: INFUSION SERIES
Discharge: HOME | End: 2019-12-27

## 2019-12-27 VITALS
DIASTOLIC BLOOD PRESSURE: 78 MMHG | TEMPERATURE: 98.7 F | SYSTOLIC BLOOD PRESSURE: 114 MMHG | WEIGHT: 250 LBS | RESPIRATION RATE: 18 BRPM | BODY MASS INDEX: 37.89 KG/M2 | HEART RATE: 112 BPM | HEIGHT: 68 IN

## 2019-12-27 DIAGNOSIS — D75.1 POLYCYTHEMIA: Primary | ICD-10-CM

## 2019-12-27 LAB
BASOPHILS # BLD AUTO: 0.04 10*3/MM3 (ref 0–0.2)
BASOPHILS NFR BLD AUTO: 0.3 % (ref 0–1.5)
DEPRECATED RDW RBC AUTO: 42.7 FL (ref 37–54)
EOSINOPHIL # BLD AUTO: 0.27 10*3/MM3 (ref 0–0.4)
EOSINOPHIL NFR BLD AUTO: 2.1 % (ref 0.3–6.2)
ERYTHROCYTE [DISTWIDTH] IN BLOOD BY AUTOMATED COUNT: 13.2 % (ref 12.3–15.4)
FERRITIN SERPL-MCNC: 271.5 NG/ML (ref 30–400)
HCT VFR BLD AUTO: 50.9 % (ref 37.5–51)
HGB BLD-MCNC: 18.3 G/DL (ref 13–17.7)
LYMPHOCYTES # BLD AUTO: 4.75 10*3/MM3 (ref 0.7–3.1)
LYMPHOCYTES NFR BLD AUTO: 36.1 % (ref 19.6–45.3)
MCH RBC QN AUTO: 32.1 PG (ref 26.6–33)
MCHC RBC AUTO-ENTMCNC: 36 G/DL (ref 31.5–35.7)
MCV RBC AUTO: 89.3 FL (ref 79–97)
MONOCYTES # BLD AUTO: 1.05 10*3/MM3 (ref 0.1–0.9)
MONOCYTES NFR BLD AUTO: 8 % (ref 5–12)
NEUTROPHILS # BLD AUTO: 7.04 10*3/MM3 (ref 1.7–7)
NEUTROPHILS NFR BLD AUTO: 53.5 % (ref 42.7–76)
PLATELET # BLD AUTO: 276 10*3/MM3 (ref 140–450)
PMV BLD AUTO: 10.1 FL (ref 6–12)
RBC # BLD AUTO: 5.7 10*6/MM3 (ref 4.14–5.8)
WBC NRBC COR # BLD: 13.15 10*3/MM3 (ref 3.4–10.8)

## 2019-12-27 PROCEDURE — 99195 PHLEBOTOMY: CPT | Performed by: INTERNAL MEDICINE

## 2019-12-27 PROCEDURE — 36415 COLL VENOUS BLD VENIPUNCTURE: CPT | Performed by: INTERNAL MEDICINE

## 2019-12-27 PROCEDURE — 82728 ASSAY OF FERRITIN: CPT | Performed by: INTERNAL MEDICINE

## 2019-12-27 PROCEDURE — 85025 COMPLETE CBC W/AUTO DIFF WBC: CPT | Performed by: INTERNAL MEDICINE

## 2020-01-09 DIAGNOSIS — D75.1 POLYCYTHEMIA: Primary | ICD-10-CM

## 2020-01-10 ENCOUNTER — APPOINTMENT (OUTPATIENT)
Dept: LAB | Facility: HOSPITAL | Age: 35
End: 2020-01-10

## 2020-01-10 ENCOUNTER — APPOINTMENT (OUTPATIENT)
Dept: ONCOLOGY | Facility: HOSPITAL | Age: 35
End: 2020-01-10

## 2020-01-10 ENCOUNTER — APPOINTMENT (OUTPATIENT)
Dept: ONCOLOGY | Facility: CLINIC | Age: 35
End: 2020-01-10

## 2020-01-15 ENCOUNTER — APPOINTMENT (OUTPATIENT)
Dept: LAB | Facility: HOSPITAL | Age: 35
End: 2020-01-15

## 2020-01-15 ENCOUNTER — APPOINTMENT (OUTPATIENT)
Dept: ONCOLOGY | Facility: HOSPITAL | Age: 35
End: 2020-01-15

## 2020-01-15 ENCOUNTER — TELEPHONE (OUTPATIENT)
Dept: ONCOLOGY | Facility: CLINIC | Age: 35
End: 2020-01-15

## 2020-01-15 NOTE — TELEPHONE ENCOUNTER
Received voice mail from the patient cancelling appointment for today due to a sick child and requested to be called back for rescheduling.  Gabrielle De Luna notified.

## 2020-01-27 ENCOUNTER — DOCUMENTATION (OUTPATIENT)
Dept: PHYSICAL THERAPY | Facility: CLINIC | Age: 35
End: 2020-01-27

## 2020-01-27 NOTE — PROGRESS NOTES
Discharge Summary  Discharge Summary from Physical Therapy Report      Number of Visits: 5     Goals: All Met    Discharge Plan: Continue with current home exercise program as instructed    Comments: Pt canceled last two appts. PT called pt and LM to follow-up, however pt did not return call. Pt was doing well overall with min overall pain. Pt will be discharged at this time.     Date of Discharge: 1/27/2020      PT SIGNATURE: Keisha Montoya PT, DPT, OCS           [place] License # [id number]

## 2020-02-27 RX ORDER — ONDANSETRON 8 MG/1
TABLET, ORALLY DISINTEGRATING ORAL
Qty: 28 TABLET | Refills: 4 | Status: SHIPPED | OUTPATIENT
Start: 2020-02-27

## 2020-03-06 ENCOUNTER — TELEPHONE (OUTPATIENT)
Dept: ONCOLOGY | Facility: CLINIC | Age: 35
End: 2020-03-06

## 2020-03-06 NOTE — TELEPHONE ENCOUNTER
Patient called to schedule a Phlebotomy appointment, tried to warm transfer the call spoke with Cain.  She stated that she will call the patient back @ [phone] with appoint once she was done scheduling to other patients.

## 2020-05-19 DIAGNOSIS — F52.21 MALE ERECTILE DISORDER (CODE): Primary | ICD-10-CM

## 2020-05-20 RX ORDER — SILDENAFIL CITRATE 20 MG/1
TABLET ORAL
Qty: 60 TABLET | Refills: 1 | Status: SHIPPED | OUTPATIENT
Start: 2020-05-20 | End: 2022-09-12

## 2020-07-10 RX ORDER — FLASH GLUCOSE SENSOR
1 KIT MISCELLANEOUS
Qty: 6 EACH | Refills: 1 | Status: SHIPPED | OUTPATIENT
Start: 2020-07-10 | End: 2020-12-11 | Stop reason: SDUPTHER

## 2020-07-22 RX ORDER — VARENICLINE TARTRATE 1 MG/1
1 TABLET, FILM COATED ORAL 2 TIMES DAILY
Qty: 60 TABLET | Refills: 4 | Status: SHIPPED | OUTPATIENT
Start: 2020-07-22 | End: 2020-12-11

## 2020-07-22 RX ORDER — FLASH GLUCOSE SCANNING READER
1 EACH MISCELLANEOUS
Qty: 6 DEVICE | Refills: 1 | Status: SHIPPED | OUTPATIENT
Start: 2020-07-22 | End: 2022-09-12

## 2020-10-06 ENCOUNTER — HOSPITAL ENCOUNTER (EMERGENCY)
Facility: HOSPITAL | Age: 35
Discharge: HOME | End: 2020-10-06
Attending: EMERGENCY MEDICINE | Admitting: EMERGENCY MEDICINE

## 2020-10-06 ENCOUNTER — APPOINTMENT (OUTPATIENT)
Dept: ULTRASOUND IMAGING | Facility: HOSPITAL | Age: 35
End: 2020-10-06

## 2020-10-06 VITALS
DIASTOLIC BLOOD PRESSURE: 64 MMHG | SYSTOLIC BLOOD PRESSURE: 119 MMHG | HEART RATE: 62 BPM | BODY MASS INDEX: 38.24 KG/M2 | WEIGHT: 258.16 LBS | OXYGEN SATURATION: 96 % | HEIGHT: 69 IN | RESPIRATION RATE: 17 BRPM | TEMPERATURE: 98 F

## 2020-10-06 DIAGNOSIS — N50.819 PAIN IN TESTICLE, UNSPECIFIED LATERALITY: Primary | ICD-10-CM

## 2020-10-06 DIAGNOSIS — I86.1: ICD-10-CM

## 2020-10-06 LAB
ANION GAP SERPL CALCULATED.3IONS-SCNC: 9 MMOL/L (ref 5–15)
BASOPHILS # BLD AUTO: 0.1 10*3/MM3 (ref 0–0.2)
BASOPHILS NFR BLD AUTO: 1 % (ref 0–1.5)
BILIRUB UR QL STRIP: NEGATIVE
BUN SERPL-MCNC: 19 MG/DL (ref 6–20)
BUN SERPL-MCNC: ABNORMAL MG/DL
BUN/CREAT SERPL: ABNORMAL
CALCIUM SPEC-SCNC: 9 MG/DL (ref 8.6–10.5)
CHLORIDE SERPL-SCNC: 102 MMOL/L (ref 98–107)
CLARITY UR: CLEAR
CO2 SERPL-SCNC: 28 MMOL/L (ref 22–29)
COLOR UR: YELLOW
CREAT SERPL-MCNC: 1.07 MG/DL (ref 0.76–1.27)
DEPRECATED RDW RBC AUTO: 41.6 FL (ref 37–54)
EOSINOPHIL # BLD AUTO: 0.2 10*3/MM3 (ref 0–0.4)
EOSINOPHIL NFR BLD AUTO: 2.2 % (ref 0.3–6.2)
ERYTHROCYTE [DISTWIDTH] IN BLOOD BY AUTOMATED COUNT: 13.3 % (ref 12.3–15.4)
GFR SERPL CREATININE-BSD FRML MDRD: 78 ML/MIN/1.73
GLUCOSE SERPL-MCNC: 126 MG/DL (ref 65–99)
GLUCOSE UR STRIP-MCNC: NEGATIVE MG/DL
HCT VFR BLD AUTO: 46.5 % (ref 37.5–51)
HGB BLD-MCNC: 15.9 G/DL (ref 13–17.7)
HGB UR QL STRIP.AUTO: NEGATIVE
KETONES UR QL STRIP: NEGATIVE
LEUKOCYTE ESTERASE UR QL STRIP.AUTO: NEGATIVE
LYMPHOCYTES # BLD AUTO: 3.5 10*3/MM3 (ref 0.7–3.1)
LYMPHOCYTES NFR BLD AUTO: 32.9 % (ref 19.6–45.3)
MCH RBC QN AUTO: 30.9 PG (ref 26.6–33)
MCHC RBC AUTO-ENTMCNC: 34.3 G/DL (ref 31.5–35.7)
MCV RBC AUTO: 90 FL (ref 79–97)
MONOCYTES # BLD AUTO: 0.7 10*3/MM3 (ref 0.1–0.9)
MONOCYTES NFR BLD AUTO: 6.7 % (ref 5–12)
NEUTROPHILS NFR BLD AUTO: 57.2 % (ref 42.7–76)
NEUTROPHILS NFR BLD AUTO: 6 10*3/MM3 (ref 1.7–7)
NITRITE UR QL STRIP: NEGATIVE
NRBC BLD AUTO-RTO: 0.1 /100 WBC (ref 0–0.2)
PH UR STRIP.AUTO: 6.5 [PH] (ref 5–8)
PLATELET # BLD AUTO: 228 10*3/MM3 (ref 140–450)
PMV BLD AUTO: 8.4 FL (ref 6–12)
POTASSIUM SERPL-SCNC: 4.1 MMOL/L (ref 3.5–5.2)
PROT UR QL STRIP: NEGATIVE
RBC # BLD AUTO: 5.17 10*6/MM3 (ref 4.14–5.8)
SODIUM SERPL-SCNC: 139 MMOL/L (ref 136–145)
SP GR UR STRIP: 1.03 (ref 1–1.03)
UROBILINOGEN UR QL STRIP: NORMAL
WBC # BLD AUTO: 10.5 10*3/MM3 (ref 3.4–10.8)

## 2020-10-06 PROCEDURE — 85025 COMPLETE CBC W/AUTO DIFF WBC: CPT | Performed by: EMERGENCY MEDICINE

## 2020-10-06 PROCEDURE — 93976 VASCULAR STUDY: CPT

## 2020-10-06 PROCEDURE — 76870 US EXAM SCROTUM: CPT

## 2020-10-06 PROCEDURE — 81003 URINALYSIS AUTO W/O SCOPE: CPT | Performed by: EMERGENCY MEDICINE

## 2020-10-06 PROCEDURE — 36415 COLL VENOUS BLD VENIPUNCTURE: CPT

## 2020-10-06 PROCEDURE — 80048 BASIC METABOLIC PNL TOTAL CA: CPT | Performed by: EMERGENCY MEDICINE

## 2020-10-06 PROCEDURE — 99283 EMERGENCY DEPT VISIT LOW MDM: CPT

## 2020-10-06 RX ORDER — ONDANSETRON 4 MG/1
4 TABLET, ORALLY DISINTEGRATING ORAL ONCE
Status: DISCONTINUED | OUTPATIENT
Start: 2020-10-06 | End: 2020-10-07 | Stop reason: HOSPADM

## 2020-10-06 RX ORDER — HYDROCODONE BITARTRATE AND ACETAMINOPHEN 5; 325 MG/1; MG/1
1 TABLET ORAL ONCE AS NEEDED
Status: DISCONTINUED | OUTPATIENT
Start: 2020-10-06 | End: 2020-10-07 | Stop reason: HOSPADM

## 2020-10-06 RX ORDER — HYDROCODONE BITARTRATE AND ACETAMINOPHEN 5; 325 MG/1; MG/1
1 TABLET ORAL EVERY 6 HOURS PRN
Qty: 12 TABLET | Refills: 0 | Status: SHIPPED | OUTPATIENT
Start: 2020-10-06 | End: 2020-10-06 | Stop reason: ALTCHOICE

## 2020-10-06 RX ORDER — DOXYCYCLINE HYCLATE 100 MG/1
100 CAPSULE ORAL 2 TIMES DAILY
Qty: 20 CAPSULE | Refills: 0 | Status: SHIPPED | OUTPATIENT
Start: 2020-10-06 | End: 2020-12-11

## 2020-10-07 NOTE — ED PROVIDER NOTES
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  Patient states is been constant he states that he had intercourse with his wife about 6 days ago and he also has been on the road for a long truck ride and is started after this.  He states is gradually gotten worse over the last 3 days he feels it is worse with movement better with rest nonradiating no penile discharge she denies any bleeding no dysuria frequency he denies any aware of sexually transmitted disease there is no redness or warmth of the skin no black or bloody stool or bowel problems.  He states he has some pressure up in his abdomen but he is able to eat and drink without difficulty there is been no vomiting or diarrhea.  No penile discharge.  No injury otherwise.  Describes it as severe throbbing aching in nature continuous          Review of Systems   Constitutional: Negative for chills and fever.   Respiratory: Negative for chest tightness and shortness of breath.    Gastrointestinal: Positive for abdominal pain. Negative for blood in stool, rectal pain and vomiting.   Endocrine: Negative for cold intolerance and heat intolerance.   Genitourinary: Positive for scrotal swelling and testicular pain. Negative for difficulty urinating, discharge, dysuria and genital sores.   Musculoskeletal: Negative for arthralgias and back pain.   Skin: Negative for color change and pallor.   Neurological: Negative for dizziness and light-headedness.   Psychiatric/Behavioral: Negative for agitation and behavioral problems.       Past Medical History:   Diagnosis Date   • Anxiety    • Depression    • Diabetes mellitus (CMS/[facility])    • Erectile dysfunction    • Hypertension    • Opiate addiction (CMS/[facility])      opiate addiction treatment   • Pneumonia    • Substance abuse (CMS/[facility])    • Tendonitis of ankle or foot     bilateral achilles tendonitis       Allergies   Allergen Reactions   • Cefaclor Hives       Past Surgical History:   Procedure Laterality Date   • FRACTURE SURGERY     • SHOULDER SURGERY Right        Family History   Problem Relation Age of Onset   • Arthritis Mother    • Arthritis Maternal Grandfather        Social History     Socioeconomic History   • Marital status:      Spouse name: Not on file   • Number of children: Not on file   • Years of education: Not on file   • Highest education level: Not on file   Tobacco Use   • Smoking status: Former Smoker     Types: Cigarettes     Start date:      Quit date: 11/15/2019     Years since quittin.8   • Smokeless tobacco: Never Used   Substance and Sexual Activity   • Alcohol use: No     Frequency: Never   • Drug use: No   • Sexual activity: Yes     Partners: Female     Prior to Admission medications    Medication Sig Start Date End Date Taking? Authorizing Provider   amLODIPine (NORVASC) 10 MG tablet  19   Jonathan Narvaez MD   Blood Glucose Monitoring Suppl (ACCU-CHEK COLT PLUS) w/Device kit ACCU-CHEK COLT PLUS w/Device KIT 3/1/19   Jonathan Narvaez MD   Continuous Blood Gluc  (FREESTYLE KIRSTEN 14 DAY READER) device Place 1 Device on the skin as directed by provider Every 14 (Fourteen) Days. 20   Sanaz Martinez PA   Continuous Blood Gluc Sensor (FREESTYLE KIRSTEN 14 DAY SENSOR) misc Place 1 application on the skin as directed by provider Every 14 (Fourteen) Days. 7/10/20   Sanaz Martinez PA   diclofenac (VOLTAREN) 75 MG EC tablet Take 1 tablet by mouth 2 (Two) Times a Day. 19   Sanaz Martinez PA   doxycycline (VIBRAMYCIN) 100 MG capsule Take 1 capsule by mouth 2 (Two) Times a Day. 10/6/20   Angel Christian MD   metFORMIN (GLUCOPHAGE) 500 MG tablet TAKE ONE TABLET BY MOUTH TWICE A DAY WITH FOOD 20    "Sanaz Martinez PA   methadone (DOLOPHINE) 5 MG/5ML solution 130 mg Daily. 6/16/14   Jonathan Narvaez MD   metoprolol succinate XL (TOPROL-XL) 50 MG 24 hr tablet Daily. 9/14/18   Jonathan Narvaez MD   Multiple Vitamins-Minerals (MENS ONE DAILY PO) Take  by mouth.    Jonathan Narvaez MD   Needle, Disp, (BD DISP NEEDLES) 22G X 1-1/2\" misc BD DISP NEEDLES 22G X 1-1/2\" 10/23/14   Jonathan Narvaez MD   ondansetron ODT (ZOFRAN-ODT) 8 MG disintegrating tablet DISSOLVE ONE TABLET BY MOUTH THREE TIMES A DAY 2/27/20   Sanaz Martinez PA   sildenafil (REVATIO) 20 MG tablet Take 2 to 5 tablets by mouth as needed 30 minutes prior to sexual activity. 5/20/20   Lulu Salazar MD   Syringe, Disposable, (B-D SYRINGE LUER-ERI 3CC) 3 ML misc BD SYRINGE LUER-ERI 3 ML 10/23/14   Jonathan Narvaez MD   Testosterone Cypionate (DEPOTESTOTERONE CYPIONATE) 200 MG/ML injection  7/29/19   Jonathan Narvaez MD   varenicline (Chantix Continuing Month Pak) 1 MG tablet Take 1 tablet by mouth 2 (Two) Times a Day. 7/22/20   Sanaz Martinez PA   varenicline (Chantix Starting Month Pak) 0.5 MG X 11 & 1 MG X 42 tablet Take 0.5 mg one daily on days 1-3 and and 0.5 mg twice daily on days 4-7.Then 1 mg twice daily for a total of 12 weeks. 7/22/20   Sanaz Martinez PA           Objective   Physical Exam  35-year-old awake alert no acute distress HEENT extraocular is tach sclera clear neck supple no adenopathy lungs clear no retraction no CVA tenderness heart regular without murmur abdomen soft nontender good bowel sounds no peritoneal findings or pulsatile masses.  The patient's testicles are downgoing he has some tenderness diffusely but mainly on the left to the posterior aspect.  The testicles bilaterally have a normal lie normal cremaster reflex there is no penile discharge sores or lesions.  There is no redness to the skin there is no evidence of scrotal cellulitis there is no abscess fluctuance or crepitus there " is no redness to the posterior skin of the testicle or to the perineum or to the buttock area no evidence of necrotizing fasciitis or even cellulitis at this point.  No sores or bullae no inguinal nodes is appreciated and no pain out of proportion to exam.  Patient's extremities good and equal pulses throughout some chronic edema but no palpable cords or Homans sign he is awake alert he follows commands motor strength normal without focal weakness  Procedures           ED Course      Results for orders placed or performed during the hospital encounter of 10/06/20   Basic Metabolic Panel    Specimen: Blood   Result Value Ref Range    Glucose 126 (H) 65 - 99 mg/dL    BUN      Creatinine 1.07 0.76 - 1.27 mg/dL    Sodium 139 136 - 145 mmol/L    Potassium 4.1 3.5 - 5.2 mmol/L    Chloride 102 98 - 107 mmol/L    CO2 28.0 22.0 - 29.0 mmol/L    Calcium 9.0 8.6 - 10.5 mg/dL    eGFR Non African Amer 78 >60 mL/min/1.73    BUN/Creatinine Ratio      Anion Gap 9.0 5.0 - 15.0 mmol/L   Urinalysis With Culture If Indicated - Urine, Clean Catch    Specimen: Urine, Clean Catch   Result Value Ref Range    Color, UA Yellow Yellow, Straw    Appearance, UA Clear Clear    pH, UA 6.5 5.0 - 8.0    Specific Gravity, UA 1.027 1.005 - 1.030    Glucose, UA Negative Negative    Ketones, UA Negative Negative    Bilirubin, UA Negative Negative    Blood, UA Negative Negative    Protein, UA Negative Negative    Leuk Esterase, UA Negative Negative    Nitrite, UA Negative Negative    Urobilinogen, UA 1.0 E.U./dL 0.2 - 1.0 E.U./dL   CBC Auto Differential    Specimen: Blood   Result Value Ref Range    WBC 10.50 3.40 - 10.80 10*3/mm3    RBC 5.17 4.14 - 5.80 10*6/mm3    Hemoglobin 15.9 13.0 - 17.7 g/dL    Hematocrit 46.5 37.5 - 51.0 %    MCV 90.0 79.0 - 97.0 fL    MCH 30.9 26.6 - 33.0 pg    MCHC 34.3 31.5 - 35.7 g/dL    RDW 13.3 12.3 - 15.4 %    RDW-SD 41.6 37.0 - 54.0 fl    MPV 8.4 6.0 - 12.0 fL    Platelets 228 140 - 450 10*3/mm3    Neutrophil % 57.2 42.7  - 76.0 %    Lymphocyte % 32.9 19.6 - 45.3 %    Monocyte % 6.7 5.0 - 12.0 %    Eosinophil % 2.2 0.3 - 6.2 %    Basophil % 1.0 0.0 - 1.5 %    Neutrophils, Absolute 6.00 1.70 - 7.00 10*3/mm3    Lymphocytes, Absolute 3.50 (H) 0.70 - 3.10 10*3/mm3    Monocytes, Absolute 0.70 0.10 - 0.90 10*3/mm3    Eosinophils, Absolute 0.20 0.00 - 0.40 10*3/mm3    Basophils, Absolute 0.10 0.00 - 0.20 10*3/mm3    nRBC 0.1 0.0 - 0.2 /100 WBC   BUN    Specimen: Blood   Result Value Ref Range    BUN 19 6 - 20 mg/dL     Us Scrotum & Testicles    Result Date: 10/6/2020  1.Small hydrocele on the right. 2.Bilateral varicoceles.  Electronically Signed By-Pepe Briones On:10/6/2020 10:01 PM This report was finalized on [id number] by  Pepe Briones, .    Medications   HYDROcodone-acetaminophen (NORCO) 5-325 MG per tablet 1 tablet (1 tablet Oral Not Given 10/6/20 2057)   ondansetron ODT (ZOFRAN-ODT) disintegrating tablet 4 mg (4 mg Oral Not Given 10/6/20 2057)                                            MDM  Number of Diagnoses or Management Options  Pain in testicle, unspecified laterality:   Varices, scrotum:   Diagnosis management comments: Medical decision making.  Patient had the above exam evaluation was given 1 hydrocodone 1 Zofran p.o. and underwent a CBC and electrolytes which were unremarkable urine was negative ultrasound showed good testicular flow bilateral no evidence of testicular torsion he had a hydrocele on the right and bilateral varicosities.  Patient's had no evidence that suggested acute epididymitis on ultrasound.  The patient repeat exam is feeling better resting comfortably we talked about the findings.  There is no current evidence of acute testicular torsion although the symptoms been going on for 5 days.  There is no evidence of acute epididymitis by ultrasound criteria.  No abscess is seen no masses or tumors otherwise.  On clinical exam there is no evidence of cellulitis or an abscess necrotizing fasciitis or Abilio's  gangrene.  The patient has no way of gonorrhea chlamydia or sexually transmitted disease.  And is been no penile discharge no sores or lesions.  We talked about the findings.  Is resting comfortably talked about what to return for and he voices understanding he will be discharged home for outpatient management.  He sees Dr. Verdugo and will follow-up in the office.  Patient was prescribed doxycycline initially was going to be given Norco but he states he cannot take narcotics so that was canceled and he has an anti-inflammatory at home he can use       Amount and/or Complexity of Data Reviewed  Clinical lab tests: reviewed  Tests in the radiology section of CPT®: reviewed        Final diagnoses:   Pain in testicle, unspecified laterality   Varices, scrotum            Angel Christian MD  10/07/20 0023

## 2020-10-07 NOTE — ED NOTES
Requested tech transport back from [facility] [place]     Pili Gonzalez  10/06/20 5501

## 2020-12-07 ENCOUNTER — HOSPITAL ENCOUNTER (EMERGENCY)
Facility: HOSPITAL | Age: 35
Discharge: HOME | End: 2020-12-07
Admitting: EMERGENCY MEDICINE

## 2020-12-07 ENCOUNTER — APPOINTMENT (OUTPATIENT)
Dept: ULTRASOUND IMAGING | Facility: HOSPITAL | Age: 35
End: 2020-12-07

## 2020-12-07 ENCOUNTER — APPOINTMENT (OUTPATIENT)
Dept: GENERAL RADIOLOGY | Facility: HOSPITAL | Age: 35
End: 2020-12-07

## 2020-12-07 ENCOUNTER — APPOINTMENT (OUTPATIENT)
Dept: RESPIRATORY THERAPY | Facility: HOSPITAL | Age: 35
End: 2020-12-07

## 2020-12-07 VITALS
TEMPERATURE: 98.6 F | BODY MASS INDEX: 37.58 KG/M2 | WEIGHT: 253.75 LBS | HEART RATE: 80 BPM | OXYGEN SATURATION: 99 % | DIASTOLIC BLOOD PRESSURE: 82 MMHG | RESPIRATION RATE: 18 BRPM | HEIGHT: 69 IN | SYSTOLIC BLOOD PRESSURE: 116 MMHG

## 2020-12-07 DIAGNOSIS — F12.90 MARIJUANA USE: ICD-10-CM

## 2020-12-07 DIAGNOSIS — R00.2 PALPITATIONS: Primary | ICD-10-CM

## 2020-12-07 DIAGNOSIS — N43.3 HYDROCELE, UNSPECIFIED HYDROCELE TYPE: ICD-10-CM

## 2020-12-07 LAB
ALBUMIN SERPL-MCNC: 4.3 G/DL (ref 3.5–5.2)
ALBUMIN/GLOB SERPL: 1.4 G/DL
ALP SERPL-CCNC: 117 U/L (ref 39–117)
ALT SERPL W P-5'-P-CCNC: 34 U/L (ref 1–41)
AMPHET+METHAMPHET UR QL: NEGATIVE
ANION GAP SERPL CALCULATED.3IONS-SCNC: 9 MMOL/L (ref 5–15)
AST SERPL-CCNC: 25 U/L (ref 1–40)
BARBITURATES UR QL SCN: NEGATIVE
BASOPHILS # BLD AUTO: 0.1 10*3/MM3 (ref 0–0.2)
BASOPHILS NFR BLD AUTO: 0.7 % (ref 0–1.5)
BENZODIAZ UR QL SCN: NEGATIVE
BILIRUB SERPL-MCNC: 0.4 MG/DL (ref 0–1.2)
BILIRUB UR QL STRIP: NEGATIVE
BUN SERPL-MCNC: 16 MG/DL (ref 6–20)
BUN/CREAT SERPL: 18.4 (ref 7–25)
CALCIUM SPEC-SCNC: 9.5 MG/DL (ref 8.6–10.5)
CANNABINOIDS SERPL QL: POSITIVE
CHLORIDE SERPL-SCNC: 100 MMOL/L (ref 98–107)
CLARITY UR: CLEAR
CO2 SERPL-SCNC: 28 MMOL/L (ref 22–29)
COCAINE UR QL: NEGATIVE
COLOR UR: YELLOW
CREAT SERPL-MCNC: 0.87 MG/DL (ref 0.76–1.27)
DEPRECATED RDW RBC AUTO: 41.6 FL (ref 37–54)
EOSINOPHIL # BLD AUTO: 0.1 10*3/MM3 (ref 0–0.4)
EOSINOPHIL NFR BLD AUTO: 0.9 % (ref 0.3–6.2)
ERYTHROCYTE [DISTWIDTH] IN BLOOD BY AUTOMATED COUNT: 13.4 % (ref 12.3–15.4)
GFR SERPL CREATININE-BSD FRML MDRD: 99 ML/MIN/1.73
GLOBULIN UR ELPH-MCNC: 3 GM/DL
GLUCOSE SERPL-MCNC: 121 MG/DL (ref 65–99)
GLUCOSE UR STRIP-MCNC: NEGATIVE MG/DL
HCT VFR BLD AUTO: 49.6 % (ref 37.5–51)
HGB BLD-MCNC: 16.7 G/DL (ref 13–17.7)
HGB UR QL STRIP.AUTO: NEGATIVE
HOLD SPECIMEN: NORMAL
KETONES UR QL STRIP: NEGATIVE
LEUKOCYTE ESTERASE UR QL STRIP.AUTO: NEGATIVE
LIPASE SERPL-CCNC: 20 U/L (ref 13–60)
LYMPHOCYTES # BLD AUTO: 3.1 10*3/MM3 (ref 0.7–3.1)
LYMPHOCYTES NFR BLD AUTO: 27 % (ref 19.6–45.3)
MCH RBC QN AUTO: 30 PG (ref 26.6–33)
MCHC RBC AUTO-ENTMCNC: 33.7 G/DL (ref 31.5–35.7)
MCV RBC AUTO: 89 FL (ref 79–97)
METHADONE UR QL SCN: POSITIVE
MONOCYTES # BLD AUTO: 0.8 10*3/MM3 (ref 0.1–0.9)
MONOCYTES NFR BLD AUTO: 6.7 % (ref 5–12)
NEUTROPHILS NFR BLD AUTO: 64.7 % (ref 42.7–76)
NEUTROPHILS NFR BLD AUTO: 7.4 10*3/MM3 (ref 1.7–7)
NITRITE UR QL STRIP: NEGATIVE
NRBC BLD AUTO-RTO: 0.1 /100 WBC (ref 0–0.2)
OPIATES UR QL: NEGATIVE
OXYCODONE UR QL SCN: NEGATIVE
PH UR STRIP.AUTO: 6 [PH] (ref 5–8)
PLATELET # BLD AUTO: 258 10*3/MM3 (ref 140–450)
PMV BLD AUTO: 8.3 FL (ref 6–12)
POTASSIUM SERPL-SCNC: 4.1 MMOL/L (ref 3.5–5.2)
PROT SERPL-MCNC: 7.3 G/DL (ref 6–8.5)
PROT UR QL STRIP: NEGATIVE
RBC # BLD AUTO: 5.58 10*6/MM3 (ref 4.14–5.8)
SODIUM SERPL-SCNC: 137 MMOL/L (ref 136–145)
SP GR UR STRIP: 1.02 (ref 1–1.03)
TROPONIN T SERPL-MCNC: <0.01 NG/ML (ref 0–0.03)
TSH SERPL DL<=0.05 MIU/L-ACNC: 1.31 UIU/ML (ref 0.27–4.2)
UROBILINOGEN UR QL STRIP: NORMAL
WBC # BLD AUTO: 11.5 10*3/MM3 (ref 3.4–10.8)

## 2020-12-07 PROCEDURE — 84484 ASSAY OF TROPONIN QUANT: CPT | Performed by: PHYSICIAN ASSISTANT

## 2020-12-07 PROCEDURE — 83690 ASSAY OF LIPASE: CPT | Performed by: PHYSICIAN ASSISTANT

## 2020-12-07 PROCEDURE — 93976 VASCULAR STUDY: CPT

## 2020-12-07 PROCEDURE — 85025 COMPLETE CBC W/AUTO DIFF WBC: CPT | Performed by: PHYSICIAN ASSISTANT

## 2020-12-07 PROCEDURE — 84443 ASSAY THYROID STIM HORMONE: CPT | Performed by: PHYSICIAN ASSISTANT

## 2020-12-07 PROCEDURE — 81003 URINALYSIS AUTO W/O SCOPE: CPT | Performed by: PHYSICIAN ASSISTANT

## 2020-12-07 PROCEDURE — 71045 X-RAY EXAM CHEST 1 VIEW: CPT

## 2020-12-07 PROCEDURE — 80307 DRUG TEST PRSMV CHEM ANLYZR: CPT | Performed by: PHYSICIAN ASSISTANT

## 2020-12-07 PROCEDURE — 99284 EMERGENCY DEPT VISIT MOD MDM: CPT

## 2020-12-07 PROCEDURE — 93005 ELECTROCARDIOGRAM TRACING: CPT

## 2020-12-07 PROCEDURE — 76870 US EXAM SCROTUM: CPT

## 2020-12-07 PROCEDURE — 93225 XTRNL ECG REC<48 HRS REC: CPT

## 2020-12-07 PROCEDURE — 80053 COMPREHEN METABOLIC PANEL: CPT | Performed by: PHYSICIAN ASSISTANT

## 2020-12-07 RX ORDER — SODIUM CHLORIDE 0.9 % (FLUSH) 0.9 %
10 SYRINGE (ML) INJECTION AS NEEDED
Status: DISCONTINUED | OUTPATIENT
Start: 2020-12-07 | End: 2020-12-07 | Stop reason: HOSPADM

## 2020-12-07 NOTE — ED PROVIDER NOTES
Subjective   Chief Complaint: Palpitations    Patient is a 35-year-old  male history of diabetes, hypertension, substance abuse presents emergency complaint of fatigue, palpitations and testicular pain for 4 days.  Patient states he was seen in the ER 2 months ago, diagnosed with epididymitis and sent home with doxycycline.  Patient states that he did not finish this antibiotic because he lost the pill bottle.  Patient states pain initially subsided but has returned.  Patient describes the pain as a throbbing pain on posterior aspect of bilateral testicles, left greater than right that he states is continuous.  Patient denies any penile swelling, pain or discharge.  Patient states he is monogamous versus with his wife, no concerns for STIs.  Patient denies any abdominal pain, nausea vomiting or diarrhea.  He denies any urinary symptoms.  Patient reports some chest pressure that does not radiate, denies any shortness of breath cough, hemoptysis or sore throat.  He denies any headache, does report some lightheadedness.  Denies any fever or chills.  He denies any unilateral weakness or paresthesias.    Location: Testicle    Quality: Throbbing    Duration: 4 days    Timing: Constant    Severity: Mild to moderate    Associated Symptoms: Palpitations    PCP: Edouard Fried      History provided by:  Patient      Review of Systems   Constitutional: Negative for chills and fever.   HENT: Negative for sore throat and trouble swallowing.    Eyes: Negative for visual disturbance.   Respiratory: Negative for chest tightness, shortness of breath and wheezing.    Cardiovascular: Positive for chest pain and palpitations.   Gastrointestinal: Negative for abdominal pain, diarrhea, nausea and vomiting.   Genitourinary: Positive for testicular pain. Negative for discharge, dysuria, penile pain and penile swelling.   Musculoskeletal: Negative for back pain and myalgias.   Skin: Negative for rash.   Neurological: Positive  for light-headedness. Negative for dizziness, weakness and headaches.   Psychiatric/Behavioral: Negative for behavioral problems. The patient is nervous/anxious.    All other systems reviewed and are negative.      Past Medical History:   Diagnosis Date   • Anxiety    • Depression    • Diabetes mellitus (CMS/[facility])    • Erectile dysfunction    • Hypertension    • Opiate addiction (CMS/[facility])     opiate addiction treatment   • Pneumonia    • Substance abuse (CMS/[facility])    • Tendonitis of ankle or foot     bilateral achilles tendonitis       Allergies   Allergen Reactions   • Cefaclor Hives       Past Surgical History:   Procedure Laterality Date   • FRACTURE SURGERY     • SHOULDER SURGERY Right        Family History   Problem Relation Age of Onset   • Arthritis Mother    • Arthritis Maternal Grandfather        Social History     Socioeconomic History   • Marital status:      Spouse name: Not on file   • Number of children: Not on file   • Years of education: Not on file   • Highest education level: Not on file   Tobacco Use   • Smoking status: Former Smoker     Types: Cigarettes     Start date:      Quit date: 11/15/2019     Years since quittin.0   • Smokeless tobacco: Never Used   Substance and Sexual Activity   • Alcohol use: No     Frequency: Never   • Drug use: No   • Sexual activity: Yes     Partners: Female           Objective   Physical Exam  Vitals signs and nursing note reviewed.   Constitutional:       General: He is not in acute distress.     Appearance: Normal appearance. He is obese. He is not diaphoretic.   HENT:      Head: Normocephalic and atraumatic.      Nose: Nose normal.      Mouth/Throat:      Mouth: Mucous membranes are moist.   Eyes:      Extraocular Movements: Extraocular movements intact.      Pupils: Pupils are equal, round, and reactive to light.   Neck:      Musculoskeletal: Normal range of motion. No neck rigidity or muscular tenderness.   Cardiovascular:      Rate and Rhythm:  Regular rhythm. Tachycardia present.      Pulses: Normal pulses.      Heart sounds: Normal heart sounds. No murmur.   Pulmonary:      Effort: Pulmonary effort is normal.      Breath sounds: Normal breath sounds. No wheezing.   Abdominal:      General: Abdomen is flat. Bowel sounds are normal.      Tenderness: There is no abdominal tenderness.   Genitourinary:     Scrotum/Testes:         Right: Tenderness present. Mass or swelling not present. Cremasteric reflex is present.          Left: Tenderness present. Mass or swelling not present. Cremasteric reflex is present.    Musculoskeletal: Normal range of motion.         General: No tenderness.      Right lower leg: No edema.      Left lower leg: No edema.      Comments: Distal pulses present 2+ bilateral upper and lower extremities, sensation to soft touch intact, motor strength 5/5 bilaterally     Skin:     General: Skin is warm.      Capillary Refill: Capillary refill takes less than 2 seconds.      Findings: No erythema.   Neurological:      General: No focal deficit present.      Mental Status: He is alert and oriented to person, place, and time.      Motor: No weakness.      Comments: Patient is awake, alert, oriented, answers questions appropriately, responds to all verbal commands   Psychiatric:         Mood and Affect: Mood normal.         Behavior: Behavior normal.         ECG 12 Lead      Date/Time: 12/7/2020 10:02 AM  Performed by: Annmarie Delgado PA  Authorized by: Annmarie Delgado PA   Interpreted by physician (Anahi)  Comparison: compared with previous ECG from 6/11/2018  Comparison to previous ECG: Sinus tachycardia, rate of 109  Rhythm: sinus tachycardia  Rate: tachycardic  BPM: 130  Conduction: conduction normal  ST Segments: ST segments normal  T Waves: T waves normal  Other: no other findings  Clinical impression: non-specific ECG                 ED Course  ED Course as of Dec 07 1542   Mon Dec 07, 2020   0911 Patient reports methadone Rx due to  "history of substance abuse   Urine Drug Screen -(!) [MM]   1229 Patient was reevaluated, he has no complaints at this time.  Vital signs are stable, heart rate 85, SPO2 98%.    [MM]   1230 Pt states he has appt with PCP Dr. Fried on Friday, 12/11/20      [MM]      ED Course User Index  [MM] Annmarie Delgado, PA    /82 (BP Location: Left arm, Patient Position: Sitting)   Pulse 80   Temp 98.6 °F (37 °C)   Resp 18   Ht 175.3 cm (69\")   Wt 115 kg (253 lb 12 oz)   SpO2 99%   BMI 37.47 kg/m²   Labs Reviewed   COMPREHENSIVE METABOLIC PANEL - Abnormal; Notable for the following components:       Result Value    Glucose 121 (*)     All other components within normal limits    Narrative:     GFR Normal >60  Chronic Kidney Disease <60  Kidney Failure <15     URINE DRUG SCREEN - Abnormal; Notable for the following components:    THC, Screen, Urine Positive (*)     Methadone Screen, Urine Positive (*)     All other components within normal limits    Narrative:     Negative Thresholds For Drugs Screened:     Amphetamines               500 ng/ml   Barbiturates               200 ng/ml   Benzodiazepines            100 ng/ml   Cocaine                    300 ng/ml   Methadone                  300 ng/ml   Opiates                    300 ng/ml   Oxycodone                  100 ng/ml   THC                        50 ng/ml    The Normal Value for all drugs tested is negative. This report includes final unconfirmed screening results to be used for medical treatment purposes only. Unconfirmed results must not be used for non-medical purposes such as employment or legal testing. Clinical consideration should be applied to any drug of abuse test, particulary when unconfirmed results are used.  All urine drugs of abuse requests without chain of custody are for medical screening purposes only.  False positives are possible.     CBC WITH AUTO DIFFERENTIAL - Abnormal; Notable for the following components:    WBC 11.50 (*)     " Neutrophils, Absolute 7.40 (*)     All other components within normal limits   LIPASE - Normal   URINALYSIS W/ CULTURE IF INDICATED - Normal    Narrative:     Urine microscopic not indicated.   TROPONIN (IN-HOUSE) - Normal    Narrative:     Troponin T Reference Range:  <= 0.03 ng/mL-   Negative for AMI  >0.03 ng/mL-     Abnormal for myocardial necrosis.  Clinicians would have to utilize clinical acumen, EKG, Troponin and serial changes to determine if it is an Acute Myocardial Infarction or myocardial injury due to an underlying chronic condition.       Results may be falsely decreased if patient taking Biotin.     TSH - Normal   CBC AND DIFFERENTIAL    Narrative:     The following orders were created for panel order CBC & Differential.  Procedure                               Abnormality         Status                     ---------                               -----------         ------                     CBC Auto Differential[[id number]]        Abnormal            Final result                 Please view results for these tests on the individual orders.   EXTRA TUBES    Narrative:     The following orders were created for panel order Extra Tubes.  Procedure                               Abnormality         Status                     ---------                               -----------         ------                     Green Top (Gel)[[id number]]                                  Final result                 Please view results for these tests on the individual orders.   GREEN TOP     Medications - No data to display  Us Scrotum & Testicles    Result Date: 12/7/2020   1. Trace right scrotal hydrocele, diminished since 10/06/2020. 2. Small bilateral scrotal varicoceles. 3. Normal flow is documented within each testicle. 4. Benign 5 mm right testicular cyst.  Electronically Signed By-Teena Ordaz MD On:12/7/2020 10:26 AM This report was finalized on [id number] by  Teena Ordaz MD.    Xr Chest 1 View    Result  Date: 12/7/2020  No acute chest finding.  Electronically Signed By-Teena Ordaz MD On:12/7/2020 9:09 AM This report was finalized on [id number] by  Teena Ordaz MD.                                           MDM  Number of Diagnoses or Management Options  Hydrocele, unspecified hydrocele type:   Marijuana use:   Palpitations:   Diagnosis management comments: MEDICAL DECISION  Epic Chart Review: No recent hospital admissions.  Comorbidities: Anxiety, depression, diabetes, history substance abuse  Differentials: Dysrhythmia, electrolyte abnormality, anxiety: Epididymitis, hydrocele, varicocele; this list is not all inclusive and does not constitute the entirety of considered causes  Radiology interpretation:  Images reviewed by me and interpreted by radiologist,   Scrotal ultrasound shows trace right scrotal hydrocele, diminished since 10/6/2020.  Small bilateral scrotal varicoceles, normal flow is documented within each testicle  Lab interpretation:  Labs viewed by me significant for, urinalysis negative for UTI.  Urine drug screen THC positive, methadone positive.  CMP glucose 121.  Lipase normal 20.  Troponin normal less than 0.01.  CBC WBC count slightly elevated 11.5  EKG interpretation: Sinus tachycardia, rate of 130 with no acute ST changes.  This is similar to previous    While in the ED IV was placed and labs were obtained appropriate PPE was worn during exam and throughout all encounters with the patient.  Patient had the above evaluation.  On arrival patient appears anxious, he is in no acute respiratory distress, he is nontoxic in appearance.  Patient is tearful.  Physical exam relatively unremarkable, he does have some scrotal tenderness with unknown erythema, swelling, penile drainage or discharge, no sign deformities gangrene.  Cremaster reflex normal.  Patient had IV established, lab work obtained.  Lab work essentially unremarkable including normal troponin.  Chest x-ray negative for acute  "cardiopulmonary abnormality.  Testicular ultrasound acute infection or testicular torsion.  Patient scrotal pain and fullness most likely secondary to hydrocele and/or varicocele which patient states he has had chronically.  Patient states that he sees Dr. Verdugo for this.  Patient vital signs significantly improved, heart rate improved to 80 bpm, SPO2 99%, blood pressure 116/82.  When I enter the room again his heart rate increases to 98.  Patient begins to ask me if I think he has numerous unrelated diagnosis including \"congestive heart failure, Hannah disease, thoracic outlet syndrome\".  I feel that patient's palpitations, likely secondary to his anxiety.  Patient denies any strong family cardiac history, patient denies any personal cardiac history.  Patient is a previous smoker, does not currently smoke.  Heart score low.  Patient was placed on Holter monitor for 48 hours for palpitations and encouraged to follow-up with his primary care provider.  Patient states that he has appointment on Friday, 12/11/2020.  Patient states that he used to be on Prozac for his anxiety but stopped the numbness, I encouraged him to discuss this with his primary care provider and suggest that he may need to restart this.  I offered hydroxyzine for anxiety, patient refused this, states it did not help him.     Discharge plan and instructions were discussed with the patient who verbalized understanding and is in agreement with the plan, all questions were answered at this time.  Patient is aware of signs symptoms that would require immediate return to the emergency room.  Patient understands importance of following up with primary care provider for further evaluation and worsening concerns as well as blood pressure recheck in the next 4 weeks.    Patient remained afebrile, nontoxic-appearing, no acute respiratory distress throughout entire emergency room stay.  Patient was discharged in improved stable condition with an upright " steady gait.         Amount and/or Complexity of Data Reviewed  Clinical lab tests: reviewed and ordered  Tests in the radiology section of CPT®: reviewed and ordered  Tests in the medicine section of CPT®: reviewed    Patient Progress  Patient progress: stable      Final diagnoses:   Palpitations   Hydrocele, unspecified hydrocele type   Marijuana use            Annmarie Delgado PA  12/07/20 1546

## 2020-12-07 NOTE — DISCHARGE INSTRUCTIONS
Wear Holter monitor for the next 48 hours.  Take Tylenol or ibuprofen at home as needed for pain.  Do not mix ibuprofen with Advil, Aleve, Mobic, diclofenac or naproxen  Continue taking medications at home as prescribed  Drink plenty of fluids, get plenty of rest, continue low intensity daily exercise.    Follow-up with primary care for new or worsening concerns as well as blood pressure check, go to your appointment scheduled on Friday.  Follow-up with urology for further management evaluation of varicocele and hydrocele  Follow-up with orthopedic specialist regarding hip pain as needed    Return to the ER for new or worsening symptoms.

## 2020-12-08 LAB — QT INTERVAL: 312 MS

## 2020-12-11 ENCOUNTER — LAB (OUTPATIENT)
Dept: FAMILY MEDICINE CLINIC | Facility: CLINIC | Age: 35
End: 2020-12-11

## 2020-12-11 ENCOUNTER — OFFICE VISIT (OUTPATIENT)
Dept: FAMILY MEDICINE CLINIC | Facility: CLINIC | Age: 35
End: 2020-12-11

## 2020-12-11 VITALS
SYSTOLIC BLOOD PRESSURE: 131 MMHG | OXYGEN SATURATION: 98 % | TEMPERATURE: 96.6 F | DIASTOLIC BLOOD PRESSURE: 89 MMHG | HEIGHT: 69 IN | WEIGHT: 255 LBS | HEART RATE: 113 BPM | BODY MASS INDEX: 37.77 KG/M2

## 2020-12-11 DIAGNOSIS — I10 ESSENTIAL HYPERTENSION: ICD-10-CM

## 2020-12-11 DIAGNOSIS — R94.31 HOLTER MONITOR, ABNORMAL: Primary | ICD-10-CM

## 2020-12-11 DIAGNOSIS — E29.1 HYPOGONADISM IN MALE: ICD-10-CM

## 2020-12-11 DIAGNOSIS — E11.65 TYPE 2 DIABETES MELLITUS WITH HYPERGLYCEMIA, WITHOUT LONG-TERM CURRENT USE OF INSULIN (HCC): Primary | ICD-10-CM

## 2020-12-11 DIAGNOSIS — I83.93 VARICOSE VEINS OF BOTH LOWER EXTREMITIES, UNSPECIFIED WHETHER COMPLICATED: ICD-10-CM

## 2020-12-11 DIAGNOSIS — E11.65 TYPE 2 DIABETES MELLITUS WITH HYPERGLYCEMIA, WITHOUT LONG-TERM CURRENT USE OF INSULIN (HCC): ICD-10-CM

## 2020-12-11 DIAGNOSIS — M25.551 RIGHT HIP PAIN: ICD-10-CM

## 2020-12-11 DIAGNOSIS — E78.5 HYPERLIPIDEMIA, UNSPECIFIED HYPERLIPIDEMIA TYPE: ICD-10-CM

## 2020-12-11 DIAGNOSIS — F41.9 ANXIETY: ICD-10-CM

## 2020-12-11 DIAGNOSIS — N45.1 EPIDIDYMITIS: ICD-10-CM

## 2020-12-11 LAB
ALBUMIN SERPL-MCNC: 4.2 G/DL (ref 3.5–5.2)
ALBUMIN/GLOB SERPL: 1.2 G/DL
ALP SERPL-CCNC: 114 U/L (ref 39–117)
ALT SERPL W P-5'-P-CCNC: 40 U/L (ref 1–41)
ANION GAP SERPL CALCULATED.3IONS-SCNC: 6.2 MMOL/L (ref 5–15)
AST SERPL-CCNC: 27 U/L (ref 1–40)
BILIRUB SERPL-MCNC: 0.3 MG/DL (ref 0–1.2)
BUN SERPL-MCNC: 14 MG/DL (ref 6–20)
BUN/CREAT SERPL: 15.7 (ref 7–25)
CALCIUM SPEC-SCNC: 9.8 MG/DL (ref 8.6–10.5)
CHLORIDE SERPL-SCNC: 99 MMOL/L (ref 98–107)
CHOLEST SERPL-MCNC: 153 MG/DL (ref 0–200)
CO2 SERPL-SCNC: 32.8 MMOL/L (ref 22–29)
CREAT SERPL-MCNC: 0.89 MG/DL (ref 0.76–1.27)
GFR SERPL CREATININE-BSD FRML MDRD: 97 ML/MIN/1.73
GLOBULIN UR ELPH-MCNC: 3.6 GM/DL
GLUCOSE SERPL-MCNC: 101 MG/DL (ref 65–99)
HBA1C MFR BLD: 6 % (ref 3.5–5.6)
HDLC SERPL-MCNC: 38 MG/DL (ref 40–60)
LDLC SERPL CALC-MCNC: 79 MG/DL (ref 0–100)
LDLC/HDLC SERPL: 1.88 {RATIO}
POTASSIUM SERPL-SCNC: 4.3 MMOL/L (ref 3.5–5.2)
PROT SERPL-MCNC: 7.8 G/DL (ref 6–8.5)
SODIUM SERPL-SCNC: 138 MMOL/L (ref 136–145)
TRIGL SERPL-MCNC: 218 MG/DL (ref 0–150)
VLDLC SERPL-MCNC: 36 MG/DL (ref 5–40)

## 2020-12-11 PROCEDURE — 93227 XTRNL ECG REC<48 HR R&I: CPT | Performed by: INTERNAL MEDICINE

## 2020-12-11 PROCEDURE — 84403 ASSAY OF TOTAL TESTOSTERONE: CPT | Performed by: NURSE PRACTITIONER

## 2020-12-11 PROCEDURE — 83036 HEMOGLOBIN GLYCOSYLATED A1C: CPT | Performed by: NURSE PRACTITIONER

## 2020-12-11 PROCEDURE — 84402 ASSAY OF FREE TESTOSTERONE: CPT | Performed by: NURSE PRACTITIONER

## 2020-12-11 PROCEDURE — 99214 OFFICE O/P EST MOD 30 MIN: CPT | Performed by: NURSE PRACTITIONER

## 2020-12-11 PROCEDURE — 36415 COLL VENOUS BLD VENIPUNCTURE: CPT

## 2020-12-11 PROCEDURE — 80061 LIPID PANEL: CPT | Performed by: NURSE PRACTITIONER

## 2020-12-11 PROCEDURE — 80053 COMPREHEN METABOLIC PANEL: CPT | Performed by: NURSE PRACTITIONER

## 2020-12-11 RX ORDER — FLASH GLUCOSE SENSOR
1 KIT MISCELLANEOUS
Qty: 6 EACH | Refills: 1 | Status: SHIPPED | OUTPATIENT
Start: 2020-12-11 | End: 2022-09-12

## 2020-12-11 RX ORDER — FLUOXETINE HYDROCHLORIDE 20 MG/1
20 CAPSULE ORAL DAILY
Qty: 30 CAPSULE | Refills: 3 | Status: SHIPPED | OUTPATIENT
Start: 2020-12-11 | End: 2021-07-02

## 2020-12-11 RX ORDER — DICLOFENAC SODIUM 75 MG/1
75 TABLET, DELAYED RELEASE ORAL 2 TIMES DAILY
Qty: 60 TABLET | Refills: 0 | Status: SHIPPED | OUTPATIENT
Start: 2020-12-11 | End: 2021-07-13

## 2020-12-11 NOTE — PROGRESS NOTES
"Subjective   Aram Moore is a 35 y.o. male.       HPI   Pt. Hasn't been to the office in over a year.  Has a list of things he'd like to address today.    1) T2DM-  It's unclear if he is taking medication regularly; requests refill on Metformin.  He has been out of glucometer sensors.  He has not had a current diabetic eye exam. It's been over a year since his last A1C.   2) HTN-  He stopped taking amlodipine on his own but can't say when exactly.  He felt his BP was in good control and he didn't need it.  He doesn't monitor BP at home. Denies any CP; palpitations; SOA: dizziness; headache; trouble with vision.    3) Hyperlipidemia - not currently on medication for this.  Due for labs.   4) Low testosterone - previously on testoserone injections but unclear when he stopped them.  Was being monitored for this by urology (Dr. Verdugo); reports he saw Dr. Verdugo a couple of months ago.  Requests that his levels be checked today due to low energy; increased fatigue.    5) Anxiety - had been on fluoxetine years ago; unclear when he stopped the med.  Feels like he is becoming more anxious again and moods are down.  He'd like to re-try the med.  Denies any SI or HI.    6) Epididymitis - seen in ER 3 days ago for testicular pain.  US showed a trace right scrotal hydrocele; small bilateral scrotal varicoceles; benign right testicular cyst. UA was negative for UTI.   He was advised to follow up with urology for chronic epididymitis. He continues to have the testicular pains but they have not worsened. He denies any swelling or redness.  He denies any urinary issues.    7) Varicose veins - feels he has \"poor circulation\" in his legs.  Has noticed \"bulges\" that come and go up ad down his legs.  Sometimes tender but not always.  Doesn't have any currently.   8) Right hip pain -  Feels \"paraesthia down from my hip into my leg\"; this comes and goes.  No known injury.  Has pain in the right hip/joint.  No swelling, redness or " warmth noted. Unsure how long this has been going on.     The following portions of the patient's history were reviewed and updated as appropriate: allergies, current medications, past family history, past medical history, past social history, past surgical history and problem list.    Review of Systems   Constitutional: Positive for fatigue. Negative for activity change, appetite change, chills, diaphoresis, fever, unexpected weight gain and unexpected weight loss.   HENT: Negative for congestion, ear discharge, ear pain, postnasal drip, rhinorrhea, sinus pressure, sore throat, swollen glands, trouble swallowing and voice change.    Eyes: Negative for blurred vision, double vision, photophobia and visual disturbance.   Respiratory: Negative for cough, chest tightness, shortness of breath and wheezing.    Cardiovascular: Negative for chest pain, palpitations and leg swelling.   Gastrointestinal: Negative for abdominal distention, abdominal pain, blood in stool, constipation, diarrhea, nausea, vomiting and indigestion.   Endocrine: Negative for cold intolerance, heat intolerance, polydipsia, polyphagia and polyuria.   Genitourinary: Positive for testicular pain. Negative for decreased urine volume, difficulty urinating, dysuria, flank pain, frequency, hematuria, urgency and urinary incontinence.   Musculoskeletal: Positive for arthralgias (right hip pain). Negative for back pain and myalgias.   Skin: Negative for rash, skin lesions and bruise.   Neurological: Positive for numbness (right hip). Negative for dizziness, tremors, weakness, light-headedness, headache and confusion.   Hematological: Negative for adenopathy.   Psychiatric/Behavioral: Positive for depressed mood. The patient is nervous/anxious.        Objective   Physical Exam  Vitals signs reviewed.   Constitutional:       General: He is not in acute distress.     Appearance: Normal appearance. He is obese.   HENT:      Head: Normocephalic and atraumatic.       Right Ear: External ear normal.      Nose: Nose normal.      Mouth/Throat:      Mouth: Mucous membranes are moist.      Pharynx: Oropharynx is clear.   Eyes:      Conjunctiva/sclera: Conjunctivae normal.   Neck:      Musculoskeletal: Normal range of motion and neck supple. No muscular tenderness.   Cardiovascular:      Rate and Rhythm: Normal rate and regular rhythm.      Pulses: Normal pulses.      Heart sounds: Normal heart sounds. No murmur.   Pulmonary:      Effort: Pulmonary effort is normal. No respiratory distress.      Breath sounds: Normal breath sounds. No wheezing.   Chest:      Chest wall: No tenderness.   Abdominal:      General: Abdomen is flat. Bowel sounds are normal. There is no distension.      Palpations: Abdomen is soft.      Tenderness: There is no abdominal tenderness. There is no right CVA tenderness, left CVA tenderness, guarding or rebound.   Musculoskeletal:      Right hip: He exhibits tenderness. He exhibits normal range of motion, normal strength, no bony tenderness, no swelling and no crepitus.      Right lower leg: No edema.      Left lower leg: No edema.   Skin:     General: Skin is warm and dry.      Capillary Refill: Capillary refill takes less than 2 seconds.      Findings: No erythema or rash.   Neurological:      General: No focal deficit present.      Mental Status: He is alert and oriented to person, place, and time.      Sensory: No sensory deficit.      Motor: No weakness.   Psychiatric:         Mood and Affect: Mood is anxious.         Speech: Speech normal.         Behavior: Behavior normal. Behavior is cooperative.         Thought Content: Thought content normal. Thought content does not include homicidal or suicidal ideation.         Cognition and Memory: Cognition normal.         Judgment: Judgment normal.           Assessment/Plan   Diagnoses and all orders for this visit:    1. Type 2 diabetes mellitus with hyperglycemia, without long-term current use of insulin  (CMS/[facility]) (Primary)  Comments:  Refil sent on Metforim and testing supplies.   Labs to be updated.   Discussed importance of regualr follow up visits.   Orders:  -     Comprehensive metabolic panel; Future  -     Hemoglobin A1c; Future  -     Lipid panel; Future  -     metFORMIN (GLUCOPHAGE) 500 MG tablet; Take 1 tablet by mouth 2 (Two) Times a Day With Meals.  Dispense: 180 tablet; Refill: 0  -     Continuous Blood Gluc Sensor (FreeStyle Polo 14 Day Sensor) misc; Place 1 application on the skin as directed by provider Every 14 (Fourteen) Days.  Dispense: 6 each; Refill: 1    2. Essential hypertension  Comments:  Stable.   Cont. current medication.   Labs ordered.   Orders:  -     Comprehensive metabolic panel; Future  -     Hemoglobin A1c; Future  -     Lipid panel; Future    3. Hyperlipidemia, unspecified hyperlipidemia type  Comments:  Stable.   Cont. current medication.   Labs ordered.   Work on healthy diet; exercise; weight loss.   Orders:  -     Comprehensive metabolic panel; Future  -     Hemoglobin A1c; Future  -     Lipid panel; Future    4. Hypogonadism in male  Comments:  Labs to be updated.   Follow up with Dr. Verdugo. (urology)  Orders:  -     Testosterone (Free & Total), LC / MS; Future    5. Anxiety  Comments:  Re-started fluoxetine 20 mg daily.   Phoen follow up in 2 weeks.   Orders:  -     FLUoxetine (PROzac) 20 MG capsule; Take 1 capsule by mouth Daily.  Dispense: 30 capsule; Refill: 3    6. Epididymitis  Comments:  Reviewed recent ER records.    Advised pt. to follow up with Urology  Orders:  -     Ambulatory Referral to Urology    7. Varicose veins of both lower extremities, unspecified whether complicated  Comments:  Referral to Vascular for evaluation.   Orders:  -     Ambulatory Referral to Vascular Surgery    8. Right hip pain  Comments:  Xray of right hip ordered.   Refill sent for diclofeanc.   Heat/ice and rest.   Orders:  -     XR Hip With or Without Pelvis 2 - 3 View Right; Future  -      diclofenac (VOLTAREN) 75 MG EC tablet; Take 1 tablet by mouth 2 (Two) Times a Day.  Dispense: 60 tablet; Refill: 0      We have discussed the importance of regular follow up visits to the office.

## 2020-12-11 NOTE — PATIENT INSTRUCTIONS
Diabetes Mellitus and Standards of Medical Care  Managing diabetes (diabetes mellitus) can be complicated. Your diabetes treatment may be managed by a team of health care providers, including:  · A physician who specializes in diabetes (endocrinologist).  · A nurse practitioner or physician assistant.  · Nurses.  · A diet and nutrition specialist (registered dietitian).  · A certified diabetes educator (CDE).  · An exercise specialist.  · A pharmacist.  · An eye doctor.  · A foot specialist (podiatrist).  · A dentist.  · A primary care provider.  · A mental health provider.  Your health care providers follow guidelines to help you get the best quality of care. The following schedule is a general guideline for your diabetes management plan. Your health care providers may give you more specific instructions.  Physical exams  Upon being diagnosed with diabetes mellitus, and each year after that, your health care provider will ask about your medical and family history. He or she will also do a physical exam. Your exam may include:  · Measuring your height, weight, and body mass index (BMI).  · Checking your blood pressure. This will be done at every routine medical visit. Your target blood pressure may vary depending on your medical conditions, your age, and other factors.  · Thyroid gland exam.  · Skin exam.  · Screening for damage to your nerves (peripheral neuropathy). This may include checking the pulse in your legs and feet and checking the level of sensation in your hands and feet.  · A complete foot exam to inspect the structure and skin of your feet, including checking for cuts, bruises, redness, blisters, sores, or other problems.  · Screening for blood vessel (vascular) problems, which may include checking the pulse in your legs and feet and checking your temperature.  Blood tests  Depending on your treatment plan and your personal needs, you may have the following tests done:  · HbA1c (hemoglobin A1c). This  test provides information about blood sugar (glucose) control over the previous 2-3 months. It is used to adjust your treatment plan, if needed. This test will be done:  ? At least 2 times a year, if you are meeting your treatment goals.  ? 4 times a year, if you are not meeting your treatment goals or if treatment goals have changed.  · Lipid testing, including total, LDL, and HDL cholesterol and triglyceride levels.  ? The goal for LDL is less than 100 mg/dL (5.5 mmol/L). If you are at high risk for complications, the goal is less than 70 mg/dL (3.9 mmol/L).  ? The goal for HDL is 40 mg/dL (2.2 mmol/L) or higher for men and 50 mg/dL (2.8 mmol/L) or higher for women. An HDL cholesterol of 60 mg/dL (3.3 mmol/L) or higher gives some protection against heart disease.  ? The goal for triglycerides is less than 150 mg/dL (8.3 mmol/L).  · Liver function tests.  · Kidney function tests.  · Thyroid function tests.  Dental and eye exams  · Visit your dentist two times a year.  · If you have type 1 diabetes, your health care provider may recommend an eye exam 3-5 years after you are diagnosed, and then once a year after your first exam.  ? For children with type 1 diabetes, a health care provider may recommend an eye exam when your child is age 10 or older and has had diabetes for 3-5 years. After the first exam, your child should get an eye exam once a year.  · If you have type 2 diabetes, your health care provider may recommend an eye exam as soon as you are diagnosed, and then once a year after your first exam.  Immunizations    · The yearly flu (influenza) vaccine is recommended for everyone 6 months or older who has diabetes.  · The pneumonia (pneumococcal) vaccine is recommended for everyone 2 years or older who has diabetes. If you are 65 or older, you may get the pneumonia vaccine as a series of two separate shots.  · The hepatitis B vaccine is recommended for adults shortly after being diagnosed with  diabetes.  · Adults and children with diabetes should receive all other vaccines according to age-specific recommendations from the Centers for Disease Control and Prevention (CDC).  Mental and emotional health  Screening for symptoms of eating disorders, anxiety, and depression is recommended at the time of diagnosis and afterward as needed. If your screening shows that you have symptoms (positive screening result), you may need more evaluation and you may work with a mental health care provider.  Treatment plan  Your treatment plan will be reviewed at every medical visit. You and your health care provider will discuss:  · How you are taking your medicines, including insulin.  · Any side effects you are experiencing.  · Your blood glucose target goals.  · The frequency of your blood glucose monitoring.  · Lifestyle habits, such as activity level as well as tobacco, alcohol, and substance use.  Diabetes self-management education  Your health care provider will assess how well you are monitoring your blood glucose levels and whether you are taking your insulin correctly. He or she may refer you to:  · A certified diabetes educator to manage your diabetes throughout your life, starting at diagnosis.  · A registered dietitian who can create or review your personal nutrition plan.  · An exercise specialist who can discuss your activity level and exercise plan.  Summary  · Managing diabetes (diabetes mellitus) can be complicated. Your diabetes treatment may be managed by a team of health care providers.  · Your health care providers follow guidelines in order to help you get the best quality of care.  · Standards of care including having regular physical exams, blood tests, blood pressure monitoring, immunizations, screening tests, and education about how to manage your diabetes.  · Your health care providers may also give you more specific instructions based on your individual health.  This information is not intended  to replace advice given to you by your health care provider. Make sure you discuss any questions you have with your health care provider.  Document Revised: 09/06/2019 Document Reviewed: 09/15/2017  Elsevier Patient Education © 2020 [employer]

## 2020-12-14 ENCOUNTER — TELEPHONE (OUTPATIENT)
Dept: FAMILY MEDICINE CLINIC | Facility: CLINIC | Age: 35
End: 2020-12-14

## 2020-12-14 NOTE — TELEPHONE ENCOUNTER
SPOKE TO PT ABOUT HIS REFERRAL. HE IS ALSO WANTING TO KNOW IF YOU CAN C ALL HIM WITH HIS LAB RESULTS. I SEE THEM, BUT DOESN'T LOOK LIKE IT HAS BEEN READ.    ALSO, PT STATES HIS BLOOD PRESSURE HAS BEEN HIGH SO HE THINKS HE MIGHT NEED TO BE BACK ON MEDS AS MINDY DISCUSSED.

## 2020-12-16 LAB
TESTOST FREE SERPL-MCNC: 3.1 PG/ML (ref 8.7–25.1)
TESTOST SERPL-MCNC: 173.6 NG/DL (ref 264–916)

## 2021-07-02 DIAGNOSIS — F41.9 ANXIETY: ICD-10-CM

## 2021-07-02 RX ORDER — FLUOXETINE HYDROCHLORIDE 20 MG/1
CAPSULE ORAL
Qty: 30 CAPSULE | Refills: 2 | Status: SHIPPED | OUTPATIENT
Start: 2021-07-02 | End: 2021-12-08

## 2021-07-13 DIAGNOSIS — E11.65 TYPE 2 DIABETES MELLITUS WITH HYPERGLYCEMIA, WITHOUT LONG-TERM CURRENT USE OF INSULIN (HCC): ICD-10-CM

## 2021-08-08 DIAGNOSIS — E11.65 TYPE 2 DIABETES MELLITUS WITH HYPERGLYCEMIA, WITHOUT LONG-TERM CURRENT USE OF INSULIN (HCC): ICD-10-CM

## 2021-09-02 RX ORDER — ONDANSETRON 8 MG/1
TABLET, ORALLY DISINTEGRATING ORAL
Qty: 28 TABLET | Refills: 4 | OUTPATIENT
Start: 2021-09-02

## 2021-12-08 DIAGNOSIS — F41.9 ANXIETY: ICD-10-CM

## 2021-12-08 RX ORDER — FLUOXETINE HYDROCHLORIDE 20 MG/1
CAPSULE ORAL
Qty: 30 CAPSULE | Refills: 2 | Status: SHIPPED | OUTPATIENT
Start: 2021-12-08 | End: 2022-05-27

## 2021-12-28 ENCOUNTER — TELEMEDICINE (OUTPATIENT)
Dept: FAMILY MEDICINE CLINIC | Facility: TELEHEALTH | Age: 36
End: 2021-12-28

## 2021-12-28 DIAGNOSIS — J06.9 ACUTE URI: Primary | ICD-10-CM

## 2021-12-28 PROCEDURE — 99212 OFFICE O/P EST SF 10 MIN: CPT | Performed by: NURSE PRACTITIONER

## 2021-12-28 NOTE — PROGRESS NOTES
Mode of Visit: Video  Location of patient: home  You have chosen to receive care through a telehealth visit.  The patient has signed the video visit consent form.  The visit included audio and video interaction. No technical issues occurred during this visit.     Chief Complaint  Sinusitis and Fever    Subjective          Aram Moore presents to [facility]  Sinus congestion, pressure and ear pain for 2-3 days with sore throat that was scratchy and worse on the first day but improved. He has felt better today but starts feeling sluggish and achy as the day goes on. He has had no known exposure to Covid and does not want to be tested, at this point, since his symptoms are resolving.    Sinusitis  This is a new problem. Episode onset: 2 days. There has been no fever (temp was at 100). The fever has been present for less than 1 day. Associated symptoms include congestion, coughing and sinus pressure. Pertinent negatives include no chills or shortness of breath.     Objective   Vital Signs:   There were no vitals taken for this visit.    Physical Exam   Constitutional: He appears well-developed and well-nourished. No distress.   HENT:   Nose: Congestion present. Right sinus exhibits no maxillary sinus tenderness. Left sinus exhibits no maxillary sinus tenderness.   Pulmonary/Chest: Effort normal.   Lymphadenopathy:        Right cervical: No anterior cervical adenopathy present.       Left cervical: No anterior cervical adenopathy present.   Neurological: He is alert.   Psychiatric: He has a normal mood and affect.     Result Review :                 Assessment and Plan    Diagnoses and all orders for this visit:    1. Acute URI (Primary)    See patient instruction sheet          I spent 15 minutes caring for Aram on this date of service. This time includes time spent by me in the following activities:preparing for the visit, obtaining and/or reviewing a separately obtained history,  performing a medically appropriate examination and/or evaluation , counseling and educating the patient/family/caregiver, ordering medications, tests, or procedures, and documenting information in the medical record  Follow Up   Return if symptoms worsen or fail to improve.  Patient was given instructions and counseling regarding his condition or for health maintenance advice. Please see specific information pulled into the AVS if appropriate.

## 2021-12-28 NOTE — PATIENT INSTRUCTIONS

## 2022-03-29 PROCEDURE — U0004 COV-19 TEST NON-CDC HGH THRU: HCPCS | Performed by: NURSE PRACTITIONER

## 2022-04-05 DIAGNOSIS — F41.9 ANXIETY: ICD-10-CM

## 2022-04-06 RX ORDER — FLUOXETINE HYDROCHLORIDE 20 MG/1
CAPSULE ORAL
Qty: 30 CAPSULE | Refills: 2 | OUTPATIENT
Start: 2022-04-06

## 2022-04-07 DIAGNOSIS — F41.9 ANXIETY: ICD-10-CM

## 2022-04-07 RX ORDER — FLUOXETINE HYDROCHLORIDE 20 MG/1
CAPSULE ORAL
Qty: 30 CAPSULE | Refills: 2 | OUTPATIENT
Start: 2022-04-07

## 2022-05-27 DIAGNOSIS — F41.9 ANXIETY: ICD-10-CM

## 2022-05-27 RX ORDER — FLUOXETINE HYDROCHLORIDE 20 MG/1
20 CAPSULE ORAL DAILY
Qty: 30 CAPSULE | Refills: 0 | Status: SHIPPED | OUTPATIENT
Start: 2022-05-27 | End: 2022-07-19 | Stop reason: SDUPTHER

## 2022-06-14 ENCOUNTER — TELEPHONE (OUTPATIENT)
Dept: FAMILY MEDICINE CLINIC | Facility: CLINIC | Age: 37
End: 2022-06-14

## 2022-07-18 DIAGNOSIS — F41.9 ANXIETY: ICD-10-CM

## 2022-07-19 DIAGNOSIS — F41.9 ANXIETY: ICD-10-CM

## 2022-07-19 RX ORDER — FLUOXETINE HYDROCHLORIDE 20 MG/1
CAPSULE ORAL
Qty: 30 CAPSULE | Refills: 0 | OUTPATIENT
Start: 2022-07-19

## 2022-07-19 RX ORDER — FLUOXETINE HYDROCHLORIDE 20 MG/1
20 CAPSULE ORAL DAILY
Qty: 30 CAPSULE | Refills: 0 | Status: SHIPPED | OUTPATIENT
Start: 2022-07-19 | End: 2022-09-20

## 2022-07-19 NOTE — TELEPHONE ENCOUNTER
Caller: LEODAN ARNOLD    Relationship: Emergency Contact    Best call back number: [phone]    Requested Prescriptions:   Requested Prescriptions     Pending Prescriptions Disp Refills   • FLUoxetine (PROzac) 20 MG capsule 30 capsule 0     Sig: Take 1 capsule by mouth Daily. NO FURTHER REFILL UNTIL SEEN IN OFFICE        Pharmacy where request should be sent: [facility] [place], [place] - [address] [place] [address] - [phone]  - [phone] FX     Does the patient have less than a 3 day supply:  [x] Yes  [] No    [facility], [facility] Rep   07/19/22 12:04 EDT

## 2022-09-07 ENCOUNTER — TELEMEDICINE (OUTPATIENT)
Dept: FAMILY MEDICINE CLINIC | Facility: TELEHEALTH | Age: 37
End: 2022-09-07

## 2022-09-07 VITALS — BODY MASS INDEX: 34.07 KG/M2 | TEMPERATURE: 99.1 F | WEIGHT: 230 LBS | HEIGHT: 69 IN

## 2022-09-07 DIAGNOSIS — B34.9 VIRAL ILLNESS: Primary | ICD-10-CM

## 2022-09-07 PROCEDURE — 99213 OFFICE O/P EST LOW 20 MIN: CPT | Performed by: NURSE PRACTITIONER

## 2022-09-07 NOTE — PROGRESS NOTES
You have chosen to receive care through a telehealth visit.  Do you consent to use a video/audio connection for your medical care today? Yes     CHIEF COMPLAINT  Cc: fever    HPI  Aram Moore is a 36 y.o. male  presents with complaint of a fever. He is also requesting a work note. His symptoms started 2022. They are noted in the ROS portion of this visit. He has no known COVID exposure. He reports being vaccinated via two doses of the Pfizer vaccine. He has taken advil for his symptoms. He reports taking a home COVID test yesterday and the results of that test were negative. He agrees to take a second home COVID test tomorrow.    Review of Systems   Constitutional: Positive for fatigue (mild) and fever.   HENT: Positive for congestion, postnasal drip, rhinorrhea, sinus pressure and sinus pain. Negative for sneezing and tinnitus. Sore throat: resolved.    Respiratory: Negative for chest tightness, shortness of breath and wheezing. Cough: resolved.    Cardiovascular: Negative for chest pain.   Gastrointestinal: Positive for diarrhea (yesterday, improving today). Negative for nausea and vomiting.   Musculoskeletal: Positive for myalgias (mild).   Neurological: Positive for headaches.       Past Medical History:   Diagnosis Date   • Anxiety    • Depression    • Diabetes mellitus (HCC)    • Erectile dysfunction    • Hypertension    • Opiate addiction (HCC)     opiate addiction treatment   • Pneumonia    • Substance abuse (HCC)    • Tendonitis of ankle or foot     bilateral achilles tendonitis       Family History   Problem Relation Age of Onset   • Arthritis Mother    • Arthritis Maternal Grandfather        Social History     Socioeconomic History   • Marital status:    Tobacco Use   • Smoking status: Former Smoker     Types: Cigarettes     Start date:      Quit date: 11/15/2019     Years since quittin.8   • Smokeless tobacco: Never Used   Vaping Use   • Vaping Use: Never used   Substance and  "Sexual Activity   • Alcohol use: No   • Drug use: No   • Sexual activity: Yes     Partners: Female       Aram Moore  reports that he quit smoking about 2 years ago. His smoking use included cigarettes. He started smoking about 16 years ago. He has never used smokeless tobacco..      Temp 99.1 °F (37.3 °C)   Ht 175.3 cm (69\")   Wt 104 kg (230 lb)   BMI 33.97 kg/m²     PHYSICAL EXAM  Physical Exam   Constitutional: He is oriented to person, place, and time. He appears well-developed and well-nourished.   HENT:   Head: Normocephalic and atraumatic.   Right Ear: External ear normal.   Left Ear: External ear normal.   Nose: Congestion present. Right sinus exhibits maxillary sinus tenderness (patient directed exam) and frontal sinus tenderness (patient directed exam). Left sinus exhibits maxillary sinus tenderness (patient directed exam) and frontal sinus tenderness (patient directed exam).   Eyes: Lids are normal. Right eye exhibits no discharge and no exudate. Left eye exhibits no discharge and no exudate. Right conjunctiva is not injected. Left conjunctiva is not injected.   Pulmonary/Chest: No accessory muscle usage. No tachypnea and no bradypnea.  No respiratory distress.No use of oxygen by nasal cannulaNo use of oxygen by mask noted.  Neurological: He is alert and oriented to person, place, and time. No cranial nerve deficit.   Skin: His skin appears normal.  Psychiatric: He has a normal mood and affect. His speech is normal and behavior is normal. Judgment and thought content normal.       Results for orders placed or performed during the hospital encounter of 03/29/22   COVID-19,APTIMA PANTHER([place]),[facility], NP/OP SWAB IN UTM/VTM/SALINE TRANSPORT MEDIA,24 HR TAT - Swab, Nasal Cavity    Specimen: Nasal Cavity; Swab   Result Value Ref Range    COVID19 Not Detected Not Detected - Ref. Range   POCT Flu A&B, Alere    Specimen: Swab   Result Value Ref Range    POC Influenza A, Molecular Negative Negative    " POC Influenza B, Molecular Negative Negative    Internal Control Passed Passed    Lot Number [id number]     Expiration Date [id number]        Diagnoses and all orders for this visit:    1. Viral illness (Primary)    Mucinex with plenty of fluids especially water to thin secretions and help with congestion.May alternate tylenol and ibuprofen  Hydrate well  Repeat COVID test tomorrow  Isolate until symptom/fever free for 24 hours    FOLLOW-UP  If symptoms worsen or persist follow up with PCP.Virtual Care or Urgent Care    Patient verbalizes understanding of medication dosage, comfort measures, instructions for treatment and follow-up.    Christina Lawson, TRISTEN  09/07/2022  17:18 EDT    The use of a video visit has been reviewed with the patient and verbal informed consent has been obtained. Myself and Aram Moore participated in this visit. The patient is located in [address] [place] [address] DR OVIEDO [place] [facility].    I am located in [place], [place]. Mychart and Zoom were utilized. I spent 25 minutes in the patient's chart for this visit.

## 2022-09-12 ENCOUNTER — OFFICE VISIT (OUTPATIENT)
Dept: FAMILY MEDICINE CLINIC | Facility: CLINIC | Age: 37
End: 2022-09-12

## 2022-09-12 VITALS
WEIGHT: 231 LBS | DIASTOLIC BLOOD PRESSURE: 93 MMHG | HEART RATE: 104 BPM | OXYGEN SATURATION: 98 % | HEIGHT: 69 IN | BODY MASS INDEX: 34.21 KG/M2 | SYSTOLIC BLOOD PRESSURE: 154 MMHG

## 2022-09-12 DIAGNOSIS — F32.A ANXIETY AND DEPRESSION: ICD-10-CM

## 2022-09-12 DIAGNOSIS — E11.65 TYPE 2 DIABETES MELLITUS WITH HYPERGLYCEMIA, WITHOUT LONG-TERM CURRENT USE OF INSULIN: ICD-10-CM

## 2022-09-12 DIAGNOSIS — L98.9 SKIN LESION OF LOWER EXTREMITY: ICD-10-CM

## 2022-09-12 DIAGNOSIS — I10 ESSENTIAL HYPERTENSION: Primary | ICD-10-CM

## 2022-09-12 DIAGNOSIS — F41.9 ANXIETY AND DEPRESSION: ICD-10-CM

## 2022-09-12 PROCEDURE — 99214 OFFICE O/P EST MOD 30 MIN: CPT | Performed by: NURSE PRACTITIONER

## 2022-09-12 RX ORDER — AMLODIPINE BESYLATE 5 MG/1
5 TABLET ORAL DAILY
Qty: 30 TABLET | Refills: 3 | Status: SHIPPED | OUTPATIENT
Start: 2022-09-12

## 2022-09-12 RX ORDER — BLOOD-GLUCOSE METER
EACH MISCELLANEOUS
Qty: 1 KIT | Refills: 0 | Status: SHIPPED | OUTPATIENT
Start: 2022-09-12 | End: 2022-09-14 | Stop reason: CLARIF

## 2022-09-12 NOTE — PROGRESS NOTES
Subjective   Aram Moore is a 36 y.o. male.       HPI   Pt is here today for 6 month follow up.   1) HTN - currently not on medication.  /93 today. BP at home 130's/90's.   Denies any CP; palpitations; SOA; dizziness; headache; trouble with vision.   2) T2DM - currently on metformin 500 mg bid. Last A1C 6% in 2020. BS running around 90's when he checks but this is not often.  Denies any polyuria or polydipsia.  Has brown spots on both lower legs; he reports they have been there a long time; never change.    3)  Anxiety/depression - currently on fluoxetine 20 mg daily.  Continues to have anxiety but doesn't feel the medication needs to be adjusted or changed at this time. Denies any SI or HI.     The following portions of the patient's history were reviewed and updated as appropriate: allergies, current medications, past family history, past medical history, past social history, past surgical history and problem list.    Review of Systems   Constitutional: Negative for appetite change, chills, fatigue and fever.   Eyes: Negative for visual disturbance.   Respiratory: Negative for cough, chest tightness, shortness of breath and wheezing.    Cardiovascular: Negative for chest pain and palpitations.   Gastrointestinal: Negative for abdominal pain, blood in stool, constipation, diarrhea, nausea, vomiting and indigestion.   Endocrine: Negative for polydipsia, polyphagia and polyuria.   Genitourinary: Negative for dysuria, frequency, hematuria and urgency.   Musculoskeletal: Negative for arthralgias and myalgias.   Skin: Positive for skin lesions.   Neurological: Negative for dizziness, weakness, light-headedness and headache.   Psychiatric/Behavioral: Negative for self-injury, suicidal ideas and depressed mood. The patient is nervous/anxious.        Objective   Physical Exam  Vitals reviewed.   Constitutional:       General: He is not in acute distress.     Appearance: Normal appearance. He is obese.   HENT:       Head: Normocephalic and atraumatic.   Cardiovascular:      Rate and Rhythm: Normal rate and regular rhythm.      Pulses: Normal pulses.      Heart sounds: Normal heart sounds. No murmur heard.  Pulmonary:      Effort: Pulmonary effort is normal. No respiratory distress.      Breath sounds: Normal breath sounds. No wheezing or rhonchi.   Chest:      Chest wall: No tenderness.   Abdominal:      Tenderness: There is no right CVA tenderness or left CVA tenderness.   Musculoskeletal:      Cervical back: Normal range of motion and neck supple. No tenderness.      Right lower leg: No edema.      Left lower leg: No edema.   Skin:     General: Skin is warm and dry.      Findings: Lesion (purplish spot on lower legs; consistent with the healing of an old abcess. Not raised; no erythema or drainage.  Spider veins also noted on both lower legs. ) present. No erythema.   Neurological:      General: No focal deficit present.      Mental Status: He is alert and oriented to person, place, and time.   Psychiatric:         Mood and Affect: Mood is anxious.         Thought Content: Thought content does not include homicidal or suicidal ideation.           Assessment & Plan   Diagnoses and all orders for this visit:    1. Essential hypertension (Primary)  Comments:  Given amlodipine 5 mg daily.   Monitor BP at home; call in BP readings in one week for review.   Labs today.   RTO in 6 mo.   Orders:  -     Comprehensive metabolic panel; Future  -     Lipid panel; Future  -     Hemoglobin A1c; Future  -     Microalbumin / Creatinine Urine Ratio - Urine, Clean Catch; Future  -     Hepatitis C antibody; Future  -     amLODIPine (NORVASC) 5 MG tablet; Take 1 tablet by mouth Daily.  Dispense: 30 tablet; Refill: 3  -     CBC w AUTO Differential; Future    2. Type 2 diabetes mellitus with hyperglycemia, without long-term current use of insulin (HCC)  Comments:  Cont. current medication.   Labs ordered today.   Update eye exam.   RTO in 6 mo.    Orders:  -     Comprehensive metabolic panel; Future  -     Lipid panel; Future  -     Hemoglobin A1c; Future  -     Microalbumin / Creatinine Urine Ratio - Urine, Clean Catch; Future  -     Hepatitis C antibody; Future  -     CBC w AUTO Differential; Future  -     Blood Glucose Monitoring Suppl (Accu-Chek [place] Plus) w/Device kit; Use to check blood sugar once daily  Dispense: 1 kit; Refill: 0    3. Anxiety and depression  Comments:  Stable.   Cont. current medication.   RTO in 6 mo.   Orders:  -     Comprehensive metabolic panel; Future  -     Lipid panel; Future  -     Hemoglobin A1c; Future  -     Microalbumin / Creatinine Urine Ratio - Urine, Clean Catch; Future  -     Hepatitis C antibody; Future  -     CBC w AUTO Differential; Future    4. Skin lesion of lower extremity  Comments:  Referral to Derm   Orders:  -     Ambulatory Referral to Dermatology

## 2022-09-14 DIAGNOSIS — E11.65 TYPE 2 DIABETES MELLITUS WITH HYPERGLYCEMIA, WITHOUT LONG-TERM CURRENT USE OF INSULIN: Primary | ICD-10-CM

## 2022-09-14 RX ORDER — LANCETS 33 GAUGE
1 EACH MISCELLANEOUS DAILY
Qty: 100 EACH | Refills: 11 | Status: SHIPPED | OUTPATIENT
Start: 2022-09-14

## 2022-09-14 RX ORDER — BLOOD-GLUCOSE METER
1 EACH MISCELLANEOUS DAILY
Qty: 1 KIT | Refills: 0 | Status: SHIPPED | OUTPATIENT
Start: 2022-09-14

## 2022-09-19 DIAGNOSIS — F41.9 ANXIETY: ICD-10-CM

## 2022-09-20 RX ORDER — FLUOXETINE HYDROCHLORIDE 20 MG/1
CAPSULE ORAL
Qty: 30 CAPSULE | Refills: 0 | Status: SHIPPED | OUTPATIENT
Start: 2022-09-20 | End: 2022-11-10

## 2022-11-09 DIAGNOSIS — F41.9 ANXIETY: ICD-10-CM

## 2022-11-10 RX ORDER — FLUOXETINE HYDROCHLORIDE 20 MG/1
20 CAPSULE ORAL DAILY
Qty: 30 CAPSULE | Refills: 1 | Status: SHIPPED | OUTPATIENT
Start: 2022-11-10 | End: 2023-02-20

## 2023-02-20 DIAGNOSIS — F41.9 ANXIETY: ICD-10-CM

## 2023-02-20 RX ORDER — FLUOXETINE HYDROCHLORIDE 20 MG/1
CAPSULE ORAL
Qty: 30 CAPSULE | Refills: 1 | Status: SHIPPED | OUTPATIENT
Start: 2023-02-20

## 2023-03-03 DIAGNOSIS — E11.65 TYPE 2 DIABETES MELLITUS WITH HYPERGLYCEMIA, WITHOUT LONG-TERM CURRENT USE OF INSULIN: ICD-10-CM

## 2023-03-03 NOTE — TELEPHONE ENCOUNTER
Caller: LEODAN ARNOLD    Relationship: Emergency Contact    Best call back number: [phone] (Mobile)    Requested Prescriptions:   Requested Prescriptions     Pending Prescriptions Disp Refills   • metFORMIN (GLUCOPHAGE) 500 MG tablet 60 tablet 0     Sig: Take 1 tablet by mouth 2 (Two) Times a Day With Meals. DUE OFFICE FOLLOW UP        Pharmacy where request should be sent: [id number] [place] [address] - [phone] [facility] [phone] FX     Additional details provided by patient: *OUT OF MEDS    Does the patient have less than a 3 day supply:  [x] Yes  [] No    Would you like a call back once the refill request has been completed: [] Yes [] No    If the office needs to give you a call back, can they leave a voicemail: [] Yes [] No    Andres Granados Rep   03/03/23 13:24 EST

## 2023-04-01 DIAGNOSIS — E11.65 TYPE 2 DIABETES MELLITUS WITH HYPERGLYCEMIA, WITHOUT LONG-TERM CURRENT USE OF INSULIN: ICD-10-CM

## 2023-05-11 DIAGNOSIS — F41.9 ANXIETY: ICD-10-CM

## 2023-05-12 RX ORDER — FLUOXETINE HYDROCHLORIDE 20 MG/1
CAPSULE ORAL
Qty: 30 CAPSULE | Refills: 1 | Status: SHIPPED | OUTPATIENT
Start: 2023-05-12

## 2023-05-30 DIAGNOSIS — E11.65 TYPE 2 DIABETES MELLITUS WITH HYPERGLYCEMIA, WITHOUT LONG-TERM CURRENT USE OF INSULIN: ICD-10-CM

## 2023-06-13 DIAGNOSIS — I10 ESSENTIAL HYPERTENSION: ICD-10-CM

## 2023-06-13 RX ORDER — AMLODIPINE BESYLATE 5 MG/1
TABLET ORAL
Qty: 30 TABLET | Refills: 3 | Status: SHIPPED | OUTPATIENT
Start: 2023-06-13

## 2023-07-31 DIAGNOSIS — E11.65 TYPE 2 DIABETES MELLITUS WITH HYPERGLYCEMIA, WITHOUT LONG-TERM CURRENT USE OF INSULIN: ICD-10-CM

## 2023-08-08 ENCOUNTER — LAB (OUTPATIENT)
Dept: FAMILY MEDICINE CLINIC | Facility: CLINIC | Age: 38
End: 2023-08-08
Payer: MEDICAID

## 2023-08-08 ENCOUNTER — OFFICE VISIT (OUTPATIENT)
Dept: FAMILY MEDICINE CLINIC | Facility: CLINIC | Age: 38
End: 2023-08-08
Payer: MEDICAID

## 2023-08-08 VITALS
OXYGEN SATURATION: 98 % | SYSTOLIC BLOOD PRESSURE: 140 MMHG | WEIGHT: 247 LBS | BODY MASS INDEX: 36.58 KG/M2 | HEART RATE: 109 BPM | HEIGHT: 69 IN | DIASTOLIC BLOOD PRESSURE: 90 MMHG

## 2023-08-08 DIAGNOSIS — F41.9 ANXIETY AND DEPRESSION: ICD-10-CM

## 2023-08-08 DIAGNOSIS — Z00.00 HEALTH MAINTENANCE EXAMINATION: Primary | ICD-10-CM

## 2023-08-08 DIAGNOSIS — I10 HYPERTENSION, UNSPECIFIED TYPE: ICD-10-CM

## 2023-08-08 DIAGNOSIS — F32.A ANXIETY AND DEPRESSION: ICD-10-CM

## 2023-08-08 DIAGNOSIS — E11.65 TYPE 2 DIABETES MELLITUS WITH HYPERGLYCEMIA, WITHOUT LONG-TERM CURRENT USE OF INSULIN: ICD-10-CM

## 2023-08-08 LAB
ALBUMIN UR-MCNC: <1.2 MG/DL
HBA1C MFR BLD: 7 % (ref 4.8–5.6)

## 2023-08-08 PROCEDURE — 36415 COLL VENOUS BLD VENIPUNCTURE: CPT

## 2023-08-08 PROCEDURE — 82043 UR ALBUMIN QUANTITATIVE: CPT | Performed by: NURSE PRACTITIONER

## 2023-08-08 PROCEDURE — 83036 HEMOGLOBIN GLYCOSYLATED A1C: CPT | Performed by: NURSE PRACTITIONER

## 2023-08-08 RX ORDER — FLUOXETINE HYDROCHLORIDE 20 MG/1
20 CAPSULE ORAL DAILY
Qty: 30 CAPSULE | Refills: 1 | Status: CANCELLED | OUTPATIENT
Start: 2023-08-08

## 2023-08-08 RX ORDER — TESTOSTERONE CYPIONATE 200 MG/ML
INJECTION, SOLUTION INTRAMUSCULAR
COMMUNITY
Start: 2023-07-13

## 2023-08-08 RX ORDER — AMLODIPINE BESYLATE 5 MG/1
5 TABLET ORAL DAILY
Qty: 90 TABLET | Refills: 1 | Status: SHIPPED | OUTPATIENT
Start: 2023-08-08

## 2023-08-08 RX ORDER — AMLODIPINE BESYLATE 5 MG/1
5 TABLET ORAL DAILY
Qty: 30 TABLET | Refills: 3 | Status: CANCELLED | OUTPATIENT
Start: 2023-08-08

## 2023-08-08 RX ORDER — FLUOXETINE HYDROCHLORIDE 20 MG/1
20 CAPSULE ORAL DAILY
Qty: 90 CAPSULE | Refills: 1 | Status: SHIPPED | OUTPATIENT
Start: 2023-08-08

## 2023-08-08 RX ORDER — ONDANSETRON 8 MG/1
8 TABLET, ORALLY DISINTEGRATING ORAL EVERY 8 HOURS PRN
Qty: 28 TABLET | Refills: 4 | Status: CANCELLED | OUTPATIENT
Start: 2023-08-08

## 2023-08-08 NOTE — PROGRESS NOTES
"Subjective   Aram Moore is a 37 y.o. male.       HPI   Pt is here today for routine physical exam.   Medical/social/family hx reviewed.   Immunizations reviewed.   Former smoker.   1) HTN - currently on amlodipine 5 mg daily.  Admits he did not take the medication today.  Denies any CP; palpitations; SOA; dizziness; headache; trouble with vision.   2) T2DM - currently on metformin 500 mg bid. Admits he is taking this \"on and off\".  Last A1C 6% in 2020.   Denies any polyuria or polydipsia.   Wants to discuss trying Ozepmic.   3)  Anxiety/depression - currently on fluoxetine 20 mg daily.  Moods stable. Denies any SI or HI.   4) Hypogonadism - seeing Dr. Verdugo for testosterone injections.      The following portions of the patient's history were reviewed and updated as appropriate: allergies, current medications, past family history, past medical history, past social history, past surgical history, and problem list.    Review of Systems   Constitutional:  Negative for chills, fatigue and fever.   Eyes:  Negative for visual disturbance.   Respiratory:  Negative for cough, shortness of breath and wheezing.    Cardiovascular:  Negative for chest pain, palpitations and leg swelling.   Gastrointestinal:  Negative for abdominal pain, blood in stool, constipation, diarrhea, nausea, vomiting and GERD.   Endocrine: Negative for polydipsia, polyphagia and polyuria.   Genitourinary:  Negative for dysuria, frequency, hematuria and urgency.   Neurological:  Negative for dizziness, weakness, light-headedness and headache.   Psychiatric/Behavioral:  Negative for depressed mood. The patient is not nervous/anxious.      Objective   Physical Exam  Vitals reviewed.   Constitutional:       General: He is not in acute distress.     Appearance: Normal appearance. He is obese.   Cardiovascular:      Rate and Rhythm: Normal rate and regular rhythm.      Pulses: Normal pulses.      Heart sounds: Normal heart sounds. No murmur " heard.  Pulmonary:      Effort: Pulmonary effort is normal. No respiratory distress.      Breath sounds: Normal breath sounds. No wheezing or rhonchi.   Abdominal:      General: Bowel sounds are normal. There is no distension.      Palpations: Abdomen is soft. There is no mass.      Tenderness: There is no abdominal tenderness. There is no right CVA tenderness, left CVA tenderness, guarding or rebound.      Hernia: No hernia is present.   Skin:     General: Skin is warm and dry.      Findings: No erythema.   Neurological:      General: No focal deficit present.      Mental Status: He is alert and oriented to person, place, and time.   Psychiatric:         Mood and Affect: Mood normal.         Assessment & Plan   Diagnoses and all orders for this visit:    1. Health maintenance examination (Primary)  Comments:  Medical/social/family hx reviewed.   Immunizations reviewed; will check on Hep B vaccine with employer.   Labs today.    2. Hypertension, unspecified type  Comments:  Advised to take medication today.   Monitor BP at home.   Cont. current medication.   Labs ordered.   RTO in 6 mo.  Orders:  -     Comprehensive metabolic panel; Future  -     Lipid panel; Future  -     Hemoglobin A1c; Future  -     amLODIPine (NORVASC) 5 MG tablet; Take 1 tablet by mouth Daily.  Dispense: 90 tablet; Refill: 1    3. Type 2 diabetes mellitus with hyperglycemia, without long-term current use of insulin  Comments:  Will consider switching to Ozempic.   Must update labs today.   RTO in 6 mo.  Orders:  -     Comprehensive metabolic panel; Future  -     Lipid panel; Future  -     Hemoglobin A1c; Future  -     MicroAlbumin, Urine, Random - Urine, Clean Catch; Future    4. Anxiety and depression  Comments:  Stable.   Cont. current medication.   Labs ordered.  Orders:  -     FLUoxetine (PROzac) 20 MG capsule; Take 1 capsule by mouth Daily.  Dispense: 90 capsule; Refill: 1             High BMI Plan  General weight loss/lifestyle  modification strategies discussed (elicit support from others; identify saboteurs; non-food rewards, etc).  Informal exercise measures discussed, e.g. taking stairs instead of elevator.

## 2023-08-09 DIAGNOSIS — E11.65 TYPE 2 DIABETES MELLITUS WITH HYPERGLYCEMIA, WITHOUT LONG-TERM CURRENT USE OF INSULIN: ICD-10-CM

## 2023-08-09 DIAGNOSIS — E78.2 MIXED HYPERLIPIDEMIA: Primary | ICD-10-CM

## 2023-08-09 NOTE — PROGRESS NOTES
Left message to return call to doctor's office at [facility]. Either to get test results or message from provider.

## 2023-08-10 NOTE — PROGRESS NOTES
Advised of finding and negative strep, back up swab sent for culture, tylenol/motrin for comfort/fever, push fluids, elevate head of bed and monitor. Recheck if not improving, getting worse or has a temp for > 4 days     EAR INFECTION:    Advised of findings, give meds as prescribed, elevate head of bed, tylenol/motrin for comfort/fever, push fluids and monitor - recheck ears in 1 month- sooner if not improving or getting worse    Instructed to call if problem worsens or does not improve within the next 24 hours otherwise, Return if symptoms worsen or fail to improve..        Left message to return call to doctor's office at [facility]. Either to get test results or message from provider.

## 2023-08-24 ENCOUNTER — TELEPHONE (OUTPATIENT)
Dept: FAMILY MEDICINE CLINIC | Facility: CLINIC | Age: 38
End: 2023-08-24

## 2023-08-24 NOTE — TELEPHONE ENCOUNTER
Caller: LEODAN ARNOLD    Relationship: Emergency Contact    Best call back number: [phone]     Caller requesting test results: LABS    When was the test performed: 08/10/23    Where was the test performed: OFFICE LAB    Additional notes:   PATIENT'S SPOUSE WOULD LIKE TO KNOW RESULTS OF TEST. PATIENT STATED HE DID NOT REALLY WANT TO KNOW. THEY WOULD LIKE TO KNOW WHAT THE RESULTS MEAN FOR HIS DIABETES

## 2023-08-25 NOTE — TELEPHONE ENCOUNTER
Left message to return call to doctor's office at [facility]. Either to get test results or message from provider.

## 2023-08-26 DIAGNOSIS — E11.65 TYPE 2 DIABETES MELLITUS WITH HYPERGLYCEMIA, WITHOUT LONG-TERM CURRENT USE OF INSULIN: ICD-10-CM

## 2023-12-13 DIAGNOSIS — F32.A ANXIETY AND DEPRESSION: ICD-10-CM

## 2023-12-13 DIAGNOSIS — E11.65 TYPE 2 DIABETES MELLITUS WITH HYPERGLYCEMIA, WITHOUT LONG-TERM CURRENT USE OF INSULIN: ICD-10-CM

## 2023-12-13 DIAGNOSIS — F41.9 ANXIETY AND DEPRESSION: ICD-10-CM

## 2023-12-13 RX ORDER — FLUOXETINE HYDROCHLORIDE 20 MG/1
20 CAPSULE ORAL DAILY
Qty: 90 CAPSULE | Refills: 1 | Status: SHIPPED | OUTPATIENT
Start: 2023-12-13

## 2024-06-03 DIAGNOSIS — I10 HYPERTENSION, UNSPECIFIED TYPE: ICD-10-CM

## 2024-06-04 RX ORDER — AMLODIPINE BESYLATE 5 MG/1
5 TABLET ORAL DAILY
Qty: 30 TABLET | Refills: 1 | Status: SHIPPED | OUTPATIENT
Start: 2024-06-04

## 2024-06-28 ENCOUNTER — HOSPITAL ENCOUNTER (EMERGENCY)
Facility: HOSPITAL | Age: 39
Discharge: HOME | End: 2024-06-28
Attending: EMERGENCY MEDICINE
Payer: MEDICAID

## 2024-06-28 VITALS
DIASTOLIC BLOOD PRESSURE: 101 MMHG | RESPIRATION RATE: 22 BRPM | WEIGHT: 234 LBS | OXYGEN SATURATION: 98 % | SYSTOLIC BLOOD PRESSURE: 139 MMHG | HEART RATE: 110 BPM | TEMPERATURE: 98 F | BODY MASS INDEX: 34.66 KG/M2 | HEIGHT: 69 IN

## 2024-06-28 DIAGNOSIS — K05.20 ACUTE PERICORONITIS: ICD-10-CM

## 2024-06-28 DIAGNOSIS — K08.89 PAIN, DENTAL: Primary | ICD-10-CM

## 2024-06-28 PROCEDURE — 99282 EMERGENCY DEPT VISIT SF MDM: CPT

## 2024-06-28 RX ORDER — DOXYCYCLINE HYCLATE 100 MG/1
100 TABLET, DELAYED RELEASE ORAL 2 TIMES DAILY
Qty: 10 TABLET | Refills: 0 | Status: SHIPPED | OUTPATIENT
Start: 2024-06-28

## 2024-06-28 NOTE — ED NOTES
Patient states he was chewing gym this morning before pain started. Patient attempted to get into dentist once pain started and was unable to.    Patient is showing signs of agitation at this time: rocking, sweating, holding right jaw, use of foul language.     Patient is respectful of staff at this time.

## 2024-06-28 NOTE — ED PROVIDER NOTES
Subjective   History of Present Illness  38-year-old male complaining of toe pain and swelling states it started after he chewed gum this morning.  He states he has known problems with his wisdom tooth on the right mandibular surface.  He reports no neck pain or stiffness reports no definite fever or chills      Review of Systems    Past Medical History:   Diagnosis Date    Anxiety     Depression     Diabetes mellitus     Erectile dysfunction     Hypertension     Opiate addiction     opiate addiction treatment    Pneumonia     Substance abuse     Tendonitis of ankle or foot     bilateral achilles tendonitis   Patient is on methadone treatment  Allergies   Allergen Reactions    Cefaclor Hives     Patient  Past Surgical History:   Procedure Laterality Date    FRACTURE SURGERY      SHOULDER SURGERY Right        Family History   Problem Relation Age of Onset    Arthritis Mother     Arthritis Maternal Grandfather        Social History     Socioeconomic History    Marital status:    Tobacco Use    Smoking status: Former     Current packs/day: 0.00     Average packs/day: 1 pack/day for 15.0 years (15.0 ttl pk-yrs)     Types: Cigarettes     Start date:      Quit date: 11/15/2019     Years since quittin.6     Passive exposure: Never    Smokeless tobacco: Never   Vaping Use    Vaping status: Never Used   Substance and Sexual Activity    Alcohol use: No    Drug use: Not Currently    Sexual activity: Yes     Partners: Female           Objective   Physical Exam  Alert nontoxic Willie Coma Scale 15 tearful:   HEENT: The patient has multiple areas of dental decay.  There is swelling noted of the gum around the tooth #32 consistent with pericoronitis.  There is no evidence of periapical abscess or sinus formation.  The patient has tender submandibular lymphadenopathy on the right   Procedures           ED Course  ED Course as of 24 1351   Fri 2024   1340 Due to significant overcrowding in the emergency  department patient was evaluated by myself in a hallway bed.  This examination might be limited by privacy concerns, noise, exposure issues, and the patient not wearing a hospital gown.  Explained to the patient our limitations and our overcrowding.  They were in agreement to continue the exam and treatment at this time. [TH]      ED Course User Index  [TH] Jluis Jj MD                                 Labs Reviewed - No data to display  Medications - No data to display  No radiology results for the last day              Medical Decision Making  The patient became more upset when he discovered that we did not do dental extraction in the emergency department.  The patient was offered a prescription for doxycycline and was encouraged to use ibuprofen in prescription doses for pain.  He was offered dental follow-up information sheet but left the emergency department, saying he was going to an emergency clinic in [place]    Problems Addressed:  Acute pericoronitis: complicated acute illness or injury  Pain, dental: complicated acute illness or injury    Risk  OTC drugs.  Prescription drug management.        Final diagnoses:   Pain, dental   Acute pericoronitis       ED Disposition  ED Disposition       ED Disposition   Discharge    Condition   Stable    Comment   --               Dentist, or oral surgeon  Instruction sheet             Medication List        New Prescriptions      doxycycline 100 MG enteric coated tablet  Commonly known as: DORYX  Take 1 tablet by mouth 2 (Two) Times a Day.               Where to Get Your Medications        These medications were sent to [facility] [id number] - [place], [place] - [address]  - [phone]  - [phone] FX  [address] [place] [address]      Phone: [phone]   doxycycline 100 MG enteric coated tablet            Jluis Jj MD  06/28/24 3213       Jluis Jj MD  06/28/24 3345

## 2024-06-28 NOTE — ED NOTES
Patient did not want to wait for D/C paperwork. Patient stated he had to go and left immediatly after provider assessment

## 2024-06-28 NOTE — DISCHARGE INSTRUCTIONS
Ibuprofen 600 mg every 6 hours as needed for pain  Medication as directed  Follow-up with dentist or oral surgeon  Elevate head today   General

## 2024-08-11 DIAGNOSIS — E11.65 TYPE 2 DIABETES MELLITUS WITH HYPERGLYCEMIA, WITHOUT LONG-TERM CURRENT USE OF INSULIN: ICD-10-CM

## 2024-08-12 DIAGNOSIS — E11.65 TYPE 2 DIABETES MELLITUS WITH HYPERGLYCEMIA, WITHOUT LONG-TERM CURRENT USE OF INSULIN: ICD-10-CM

## 2024-09-05 DIAGNOSIS — E11.65 TYPE 2 DIABETES MELLITUS WITH HYPERGLYCEMIA, WITHOUT LONG-TERM CURRENT USE OF INSULIN: ICD-10-CM

## 2024-09-06 DIAGNOSIS — I10 HYPERTENSION, UNSPECIFIED TYPE: ICD-10-CM

## 2024-09-06 RX ORDER — AMLODIPINE BESYLATE 5 MG/1
5 TABLET ORAL DAILY
Qty: 30 TABLET | Refills: 0 | Status: SHIPPED | OUTPATIENT
Start: 2024-09-06

## 2024-09-10 DIAGNOSIS — I10 HYPERTENSION, UNSPECIFIED TYPE: ICD-10-CM

## 2024-09-10 RX ORDER — AMLODIPINE BESYLATE 5 MG/1
5 TABLET ORAL DAILY
Qty: 30 TABLET | Refills: 0 | OUTPATIENT
Start: 2024-09-10

## 2024-09-10 NOTE — TELEPHONE ENCOUNTER
Caller: LEODAN ARNOLD    Relationship: Emergency Contact    Best call back number: [phone]     Requested Prescriptions:   Requested Prescriptions     Pending Prescriptions Disp Refills    amLODIPine (NORVASC) 5 MG tablet 30 tablet 0     Sig: Take 1 tablet by mouth Daily. NO FURTHER REFILL UNTIL SEEN IN OFFICE        Pharmacy where request should be sent: [id number] - [facility] [address] - [phone]  - [phone] FX     Last office visit with prescribing clinician: 8/8/2023   Last telemedicine visit with prescribing clinician: Visit date not found   Next office visit with prescribing clinician: Visit date not found     Additional details provided by patient: PATIENTS WIFE STATES THAT PATIENT DOES NOT CURRENTLY HAVE INSURANCE AND NEEDS HIS PRESCRIPTION SENT TO THIS PHARMACY.     PATIENTS WIFE STATES THAT PATIENT IS OUT OF THIS MEDICATION.     Does the patient have less than a 3 day supply:  [x] Yes  [] No    Would you like a call back once the refill request has been completed: [] Yes [x] No    If the office needs to give you a call back, can they leave a voicemail: [] Yes [x] No    Andres Reardon Rep   09/10/24 12:15 EDT

## 2024-10-05 DIAGNOSIS — F32.A ANXIETY AND DEPRESSION: ICD-10-CM

## 2024-10-05 DIAGNOSIS — F41.9 ANXIETY AND DEPRESSION: ICD-10-CM

## 2024-10-16 DIAGNOSIS — I10 HYPERTENSION, UNSPECIFIED TYPE: ICD-10-CM

## 2024-10-16 RX ORDER — AMLODIPINE BESYLATE 5 MG/1
5 TABLET ORAL DAILY
Qty: 30 TABLET | Refills: 0 | Status: SHIPPED | OUTPATIENT
Start: 2024-10-16

## 2024-10-16 NOTE — TELEPHONE ENCOUNTER
PT Comment: HE IS CURRENTLY WITHOUT INSURANCE BUT SHOULD HAVE INSURANCE AGAIN IN NOVEMBER. APPOINTMENT SCHEDULED FOR NOVEMBER

## 2024-10-16 NOTE — TELEPHONE ENCOUNTER
Caller: LEODAN ARNOLD    Relationship: Emergency Contact    Best call back number: [phone]    Requested Prescriptions:   Requested Prescriptions     Pending Prescriptions Disp Refills    amLODIPine (NORVASC) 5 MG tablet 30 tablet 0     Sig: Take 1 tablet by mouth Daily. NO FURTHER REFILL UNTIL SEEN IN OFFICE        Pharmacy where request should be sent: [id number] - [facility] [address] - [phone]  - [phone] FX     Last office visit with prescribing clinician: 8/8/2023   Last telemedicine visit with prescribing clinician: Visit date not found   Next office visit with prescribing clinician: 11/18/2024     Additional details provided by patient:   PATIENT IS OUT OF MEDICINE. HE IS CURRENTLY WITHOUT INSURANCE BUT SHOULD HAVE INSURANCE AGAIN IN NOVEMBER. APPOINTMENT SCHEDULED FOR NOVEMBER    Does the patient have less than a 3 day supply:  [x] Yes  [] No        Andres Vilchis Rep   10/16/24 11:53 EDT

## 2024-11-01 DIAGNOSIS — E11.65 TYPE 2 DIABETES MELLITUS WITH HYPERGLYCEMIA, WITHOUT LONG-TERM CURRENT USE OF INSULIN: ICD-10-CM

## 2024-11-19 DIAGNOSIS — F41.9 ANXIETY AND DEPRESSION: ICD-10-CM

## 2024-11-19 DIAGNOSIS — F32.A ANXIETY AND DEPRESSION: ICD-10-CM

## 2024-11-19 NOTE — TELEPHONE ENCOUNTER
Caller: LEODAN ARNOLD    Relationship: Emergency Contact    Best call back number: [phone]     Requested Prescriptions:   Requested Prescriptions     Pending Prescriptions Disp Refills    FLUoxetine (PROzac) 20 MG capsule 30 capsule 0     Sig: Take 1 capsule by mouth Daily. NO FURTHER REFILL UNTIL SEEN IN OFFICE        Pharmacy where request should be sent: [id number] [address] - [phone]  - [phone] FX     Last office visit with prescribing clinician: 8/8/2023   Last telemedicine visit with prescribing clinician: Visit date not found   Next office visit with prescribing clinician: Visit date not found     Additional details provided by patient: PATIENT IS OUT OF THE MEDICINE.  HE DOES NOT HAVE INSURANCE UNTIL NEXT MONTH    Does the patient have less than a 3 day supply:  [x] Yes  [] No        Andres Vilchis Rep   11/19/24 13:53 EST

## 2024-12-31 DIAGNOSIS — E11.65 TYPE 2 DIABETES MELLITUS WITH HYPERGLYCEMIA, WITHOUT LONG-TERM CURRENT USE OF INSULIN: ICD-10-CM

## 2024-12-31 DIAGNOSIS — F41.9 ANXIETY AND DEPRESSION: ICD-10-CM

## 2024-12-31 DIAGNOSIS — F32.A ANXIETY AND DEPRESSION: ICD-10-CM

## 2025-02-17 DIAGNOSIS — F32.A ANXIETY AND DEPRESSION: ICD-10-CM

## 2025-02-17 DIAGNOSIS — E11.65 TYPE 2 DIABETES MELLITUS WITH HYPERGLYCEMIA, WITHOUT LONG-TERM CURRENT USE OF INSULIN: ICD-10-CM

## 2025-02-17 DIAGNOSIS — F41.9 ANXIETY AND DEPRESSION: ICD-10-CM

## 2025-02-17 NOTE — TELEPHONE ENCOUNTER
STILL WORKING ON INSURANCE AND GETTING PREMIUMS PAID     NEEDING REFILLS PLEASE     OUT OF MEDS     Caller: LEODAN ARNOLD    Relationship: Emergency Contact    Best call back number: [phone] (Mobile)     Requested Prescriptions:   Requested Prescriptions     Pending Prescriptions Disp Refills    FLUoxetine (PROzac) 20 MG capsule 30 capsule 0     Sig: Take 1 capsule by mouth Daily.    metFORMIN (GLUCOPHAGE) 500 MG tablet 30 tablet 0     Sig: Take 1 tablet by mouth Daily With Breakfast.        Pharmacy where request should be sent: [id number] - [facility] [address] - [phone]  - [phone] FX     Last office visit with prescribing clinician: 8/8/2023   Last telemedicine visit with prescribing clinician: Visit date not found   Next office visit with prescribing clinician: Visit date not found     Additional details provided by patient:     Does the patient have less than a 3 day supply:  [x] Yes  [] No    Would you like a call back once the refill request has been completed: [x] Yes [] No    If the office needs to give you a call back, can they leave a voicemail: [] Yes [] No    Andres Granados Rep   02/17/25 11:58 EST

## 2025-02-18 DIAGNOSIS — F32.A ANXIETY AND DEPRESSION: ICD-10-CM

## 2025-02-18 DIAGNOSIS — F41.9 ANXIETY AND DEPRESSION: ICD-10-CM

## 2025-02-18 DIAGNOSIS — E11.65 TYPE 2 DIABETES MELLITUS WITH HYPERGLYCEMIA, WITHOUT LONG-TERM CURRENT USE OF INSULIN: ICD-10-CM

## 2025-02-18 NOTE — TELEPHONE ENCOUNTER
Caller: LEODAN ARNOLD    Relationship: Emergency Contact    Best call back number:[phone]   Requested Prescriptions:   Requested Prescriptions     Pending Prescriptions Disp Refills    FLUoxetine (PROzac) 20 MG capsule 30 capsule 0     Sig: Take 1 capsule by mouth Daily.    metFORMIN (GLUCOPHAGE) 500 MG tablet 30 tablet 0     Sig: Take 1 tablet by mouth Daily With Breakfast.        Pharmacy where request should be sent: [facility] [place] [id number] [facility] - [phone]  - [phone] FX     Last office visit with prescribing clinician: 8/8/2023   Last telemedicine visit with prescribing clinician: Visit date not found   Next office visit with prescribing clinician: Visit date not found     Additional details provided by patient: OUT    Does the patient have less than a 3 day supply:  [x] Yes  [] No    Would you like a call back once the refill request has been completed: [x] Yes [] No    If the office needs to give you a call back, can they leave a voicemail: [] Yes [] No    Angel Lobo   02/18/25 15:47 EST

## 2025-02-21 DIAGNOSIS — F41.9 ANXIETY AND DEPRESSION: ICD-10-CM

## 2025-02-21 DIAGNOSIS — F32.A ANXIETY AND DEPRESSION: ICD-10-CM

## 2025-02-21 NOTE — TELEPHONE ENCOUNTER
Caller: LEODAN ARNOLD    Relationship: Emergency Contact    Best call back number: [phone]    Requested Prescriptions:   Requested Prescriptions     Refused Prescriptions Disp Refills    FLUoxetine (PROzac) 20 MG capsule [Pharmacy Med Name: FLUoxetine HCl Oral Capsule 20 MG] 30 capsule 0     Sig: TAKE 1 CAPSULE BY MOUTH EVERY DAY     Refused By: SHANE ALVARENGA     Reason for Refusal: Patient needs an appointment        Pharmacy where request should be sent: [id number] - [place] [id number] [facility] - [phone]  - [phone]      Last office visit with prescribing clinician: Visit date not found   Last telemedicine visit with prescribing clinician: Visit date not found   Next office visit with prescribing clinician: Visit date not found     Additional details provided by patient: IS OUT OF MEDICATION, [employer] DOES NOT TAKE THE NEW INSURANCE THE PATIENT CAN AFFORD, WOULD LIKE TO SEE IF THIS CAN BE REFILLED, HE WILL HAVE TO FIND A NEW PROVIDER, PATIENT IS OUT OF MEDICATION AND HAS MISSED A COUPLE OF DAYS OF WORK. CANNOT AFFORD TO LOSE HIS JOB, HE IS THE SOLE PROVIDER     Does the patient have less than a 3 day supply:  [x] Yes  [] No    Would you like a call back once the refill request has been completed: [] Yes [x] No    If the office needs to give you a call back, can they leave a voicemail: [] Yes [x] No    Regine Oh   02/21/25 12:16 EST